# Patient Record
Sex: FEMALE | HISPANIC OR LATINO | Employment: UNEMPLOYED | URBAN - METROPOLITAN AREA
[De-identification: names, ages, dates, MRNs, and addresses within clinical notes are randomized per-mention and may not be internally consistent; named-entity substitution may affect disease eponyms.]

---

## 2017-02-15 ENCOUNTER — OFFICE VISIT (OUTPATIENT)
Dept: CARDIOLOGY | Facility: MEDICAL CENTER | Age: 52
End: 2017-02-15
Payer: COMMERCIAL

## 2017-02-15 VITALS
SYSTOLIC BLOOD PRESSURE: 116 MMHG | WEIGHT: 188 LBS | DIASTOLIC BLOOD PRESSURE: 74 MMHG | HEIGHT: 63 IN | HEART RATE: 74 BPM | OXYGEN SATURATION: 97 % | BODY MASS INDEX: 33.31 KG/M2

## 2017-02-15 DIAGNOSIS — I10 ESSENTIAL HYPERTENSION: ICD-10-CM

## 2017-02-15 DIAGNOSIS — R07.9 CHEST PAIN, UNSPECIFIED TYPE: ICD-10-CM

## 2017-02-15 DIAGNOSIS — R73.03 PRE-DIABETES: ICD-10-CM

## 2017-02-15 PROCEDURE — 99204 OFFICE O/P NEW MOD 45 MIN: CPT | Performed by: INTERNAL MEDICINE

## 2017-02-15 PROCEDURE — 93000 ELECTROCARDIOGRAM COMPLETE: CPT | Performed by: INTERNAL MEDICINE

## 2017-02-15 RX ORDER — LOSARTAN POTASSIUM 50 MG/1
50 TABLET ORAL DAILY
COMMUNITY

## 2017-02-15 ASSESSMENT — ENCOUNTER SYMPTOMS
ABDOMINAL PAIN: 0
WEIGHT LOSS: 0
EYE PAIN: 0
BRUISES/BLEEDS EASILY: 0
PALPITATIONS: 0
MYALGIAS: 0
DEPRESSION: 0
VOMITING: 0
SHORTNESS OF BREATH: 0
DOUBLE VISION: 0
CHILLS: 0
CLAUDICATION: 0
HALLUCINATIONS: 0
LOSS OF CONSCIOUSNESS: 0
COUGH: 0
FEVER: 0
DIZZINESS: 0
BLOOD IN STOOL: 0
NAUSEA: 0
SPEECH CHANGE: 0
SENSORY CHANGE: 0
FALLS: 0
HEADACHES: 0
ORTHOPNEA: 0
BLURRED VISION: 0
PND: 0
EYE DISCHARGE: 0

## 2017-02-15 NOTE — PROGRESS NOTES
Subjective:   Lorena Bermudez is a 51 y.o. female who presents today for evaluation of chest pain. Patient has been reporting of having chest pain for several months. Usually happens at rest at nighttime and persist throughout the night. Chest pain is described as pressure-like sensation. Nonspecific precipitating factors. Nothing makes it better.    Lorena Bermudez does not have any history of heart attack arrhythmias in the past. she never had transthoracic echocardiogram, cardiac catheterization or ablations procedure in the past. At this time, she denies having shortness of breath presyncopal syncopal episodes.     I have reviewed patient's ECG, which shows normal sinus rhythm, normal OR, QT intervals. No evidence of acute coronary syndrome.    Past Medical History   Diagnosis Date   • Indigestion    • Other specified disorder of intestines    • Heart burn    • Psychiatric problem      DEPRESSION     Past Surgical History   Procedure Laterality Date   • Gyn surgery  2008     hysterectomy   • Batsheva by laparoscopy  7/24/2014     Performed by Charles Diop M.D. at SURGERY [facility]     History reviewed. No pertinent family history.  History   Smoking status   • Never Smoker    Smokeless tobacco   • Never Used     No Known Allergies  Outpatient Encounter Prescriptions as of 2/15/2017   Medication Sig Dispense Refill   • losartan (COZAAR) 50 MG Tab Take 50 mg by mouth every day.     • metformin (GLUCOPHAGE) 500 MG Tab Take 500 mg by mouth 2 times a day, with meals.     • HYDROCHLOROTHIAZIDE PO Take  by mouth.     • propranolol (INDERAL) 20 MG TABS Take 20 mg by mouth every day.     • fluoxetine (PROZAC) 20 MG CAPS Take 20 mg by mouth every day.     • clonazepam (KLONOPIN) 0.5 MG TABS Take 0.25 mg by mouth 2 times a day.     • metoclopramide (REGLAN) 10 MG TABS Take 1 Tab by mouth 3 times a day before meals. (Patient not taking: Reported on 2/15/2017) 60 Tab 1   • hydrocodone-acetaminophen  (NORCO) 7.5-325 MG per tablet Take 1-2 Tabs by mouth every 6 hours as needed for Mild Pain. (Patient not taking: Reported on 2/15/2017) 40 Each 0   • enalapril (VASOTEC) 20 MG tablet Take 20 mg by mouth every day.     • clonazepam (KLONOPIN) 0.5 MG TABS Take 0.25 mg by mouth 2 times a day.     • fluoxetine (PROZAC) 20 MG CAPS Take 20 mg by mouth 2 times a day.     • acetaminophen (TYLENOL) 325 MG TABS Take 650 mg by mouth every four hours as needed.     • sucralfate (CARAFATE) 1 GM TABS Take 1 Tab by mouth 4 Times a Day,Before Meals and at Bedtime. (Patient not taking: Reported on 2/15/2017) 120 Tab 3   • enalapril (VASOTEC) 20 MG tablet Take 20 mg by mouth every day.     • propranolol (INDERAL) 20 MG TABS Take 20 mg by mouth 3 times a day.     • quetiapine (SEROQUEL) 25 MG TABS Take 25 mg by mouth 2 times a day.     • simvastatin (ZOCOR) 20 MG TABS Take 20 mg by mouth every evening.     • meclizine (ANTIVERT) 25 MG TABS Take 1 Tab by mouth 3 times a day as needed. (Patient not taking: Reported on 2/15/2017) 30 Tab 0   • fluticasone (FLONASE) 50 MCG/ACT nasal spray Spray 2 Sprays in nose every day. Each Nostril (Patient not taking: Reported on 2/15/2017) 16 g 0   • amlodipine (NORVASC) 5 MG TABS Take 1 Tab by mouth every day. (Patient not taking: Reported on 2/15/2017) 30 Tab 0     No facility-administered encounter medications on file as of 2/15/2017.     Review of Systems   Constitutional: Negative for fever, chills, weight loss and malaise/fatigue.   HENT: Negative for ear discharge, ear pain, hearing loss and nosebleeds.    Eyes: Negative for blurred vision, double vision, pain and discharge.   Respiratory: Negative for cough and shortness of breath.    Cardiovascular: Positive for chest pain. Negative for palpitations, orthopnea, claudication, leg swelling and PND.   Gastrointestinal: Negative for nausea, vomiting, abdominal pain, blood in stool and melena.   Genitourinary: Negative for dysuria and hematuria.  "  Musculoskeletal: Negative for myalgias, joint pain and falls.   Skin: Negative for itching and rash.   Neurological: Negative for dizziness, sensory change, speech change, loss of consciousness and headaches.   Endo/Heme/Allergies: Negative for environmental allergies. Does not bruise/bleed easily.   Psychiatric/Behavioral: Negative for depression, suicidal ideas and hallucinations.        Objective:   /74 mmHg  Pulse 74  Ht 1.6 m (5' 3\")  Wt 85.276 kg (188 lb)  BMI 33.31 kg/m2  SpO2 97%    Physical Exam   Constitutional: She is oriented to person, place, and time. She appears well-developed and well-nourished.   HENT:   Head: Normocephalic and atraumatic.   Eyes: EOM are normal.   Neck: No JVD present.   Cardiovascular: Normal rate, regular rhythm, normal heart sounds and intact distal pulses.  Exam reveals no gallop and no friction rub.    No murmur heard.  Pulmonary/Chest: No respiratory distress. She has no wheezes. She has no rales. She exhibits no tenderness.   Abdominal: She exhibits no distension. There is no tenderness. There is no rebound and no guarding.   Musculoskeletal: She exhibits no edema or tenderness.   Lymphadenopathy:     She has no cervical adenopathy.   Neurological: She is alert and oriented to person, place, and time.   Skin: Skin is dry.   Psychiatric: She has a normal mood and affect.   Nursing note and vitals reviewed.      Assessment:     1. Chest pain, unspecified type  EKG    ECHOCARDIOGRAM COMP W/O CONT    PET SCAN MYOCARDIAL PERFUSION   2. Essential hypertension     3. Pre-diabetes         Medical Decision Making:  Today's Assessment / Status / Plan:     To further stratify:   I will order transthoracic echocardiogram to assess for structural abnormalities.  Based on body habitus, we will submit for myocardial PET scan to assess for coronary ischemia.    Blood pressure is well controlled.  Cont current medications at current dose.     I will see patient back in clinic " with lab tests and studies results in 3 months.    I thank you Reyna for referring patient to our Cardiology Clinic today.

## 2017-02-15 NOTE — Clinical Note
[facility]   [address]  [place] [address]  Phone: [phone]  Fax: [phone]              Lorena Bermudez  1965    Encounter Date: 2/15/2017    Suresh Siegel M.D.          PROGRESS NOTE:  Subjective:   Lorena Bermudez is a 51 y.o. female who presents today for evaluation of chest pain. Patient has been reporting of having chest pain for several months. Usually happens at rest at nighttime and persist throughout the night. Chest pain is described as pressure-like sensation. Nonspecific precipitating factors. Nothing makes it better.    Lorena Bermudez does not have any history of heart attack arrhythmias in the past. she never had transthoracic echocardiogram, cardiac catheterization or ablations procedure in the past. At this time, she denies having shortness of breath presyncopal syncopal episodes.     I have reviewed patient's ECG, which shows normal sinus rhythm, normal CO, QT intervals. No evidence of acute coronary syndrome.    Past Medical History   Diagnosis Date   • Indigestion    • Other specified disorder of intestines    • Heart burn    • Psychiatric problem      DEPRESSION     Past Surgical History   Procedure Laterality Date   • Gyn surgery  2008     hysterectomy   • Batsheva by laparoscopy  7/24/2014     Performed by Charles Diop M.D. at SURGERY [facility]S     History reviewed. No pertinent family history.  History   Smoking status   • Never Smoker    Smokeless tobacco   • Never Used     No Known Allergies  Outpatient Encounter Prescriptions as of 2/15/2017   Medication Sig Dispense Refill   • losartan (COZAAR) 50 MG Tab Take 50 mg by mouth every day.     • metformin (GLUCOPHAGE) 500 MG Tab Take 500 mg by mouth 2 times a day, with meals.     • HYDROCHLOROTHIAZIDE PO Take  by mouth.     • propranolol (INDERAL) 20 MG TABS Take 20 mg by mouth every day.     • fluoxetine (PROZAC) 20 MG CAPS Take 20 mg by  mouth every day.     • clonazepam (KLONOPIN) 0.5 MG TABS Take 0.25 mg by mouth 2 times a day.     • metoclopramide (REGLAN) 10 MG TABS Take 1 Tab by mouth 3 times a day before meals. (Patient not taking: Reported on 2/15/2017) 60 Tab 1   • hydrocodone-acetaminophen (NORCO) 7.5-325 MG per tablet Take 1-2 Tabs by mouth every 6 hours as needed for Mild Pain. (Patient not taking: Reported on 2/15/2017) 40 Each 0   • enalapril (VASOTEC) 20 MG tablet Take 20 mg by mouth every day.     • clonazepam (KLONOPIN) 0.5 MG TABS Take 0.25 mg by mouth 2 times a day.     • fluoxetine (PROZAC) 20 MG CAPS Take 20 mg by mouth 2 times a day.     • acetaminophen (TYLENOL) 325 MG TABS Take 650 mg by mouth every four hours as needed.     • sucralfate (CARAFATE) 1 GM TABS Take 1 Tab by mouth 4 Times a Day,Before Meals and at Bedtime. (Patient not taking: Reported on 2/15/2017) 120 Tab 3   • enalapril (VASOTEC) 20 MG tablet Take 20 mg by mouth every day.     • propranolol (INDERAL) 20 MG TABS Take 20 mg by mouth 3 times a day.     • quetiapine (SEROQUEL) 25 MG TABS Take 25 mg by mouth 2 times a day.     • simvastatin (ZOCOR) 20 MG TABS Take 20 mg by mouth every evening.     • meclizine (ANTIVERT) 25 MG TABS Take 1 Tab by mouth 3 times a day as needed. (Patient not taking: Reported on 2/15/2017) 30 Tab 0   • fluticasone (FLONASE) 50 MCG/ACT nasal spray Spray 2 Sprays in nose every day. Each Nostril (Patient not taking: Reported on 2/15/2017) 16 g 0   • amlodipine (NORVASC) 5 MG TABS Take 1 Tab by mouth every day. (Patient not taking: Reported on 2/15/2017) 30 Tab 0     No facility-administered encounter medications on file as of 2/15/2017.     Review of Systems   Constitutional: Negative for fever, chills, weight loss and malaise/fatigue.   HENT: Negative for ear discharge, ear pain, hearing loss and nosebleeds.    Eyes: Negative for blurred vision, double vision, pain and discharge.   Respiratory: Negative for cough and shortness of breath.   "  Cardiovascular: Positive for chest pain. Negative for palpitations, orthopnea, claudication, leg swelling and PND.   Gastrointestinal: Negative for nausea, vomiting, abdominal pain, blood in stool and melena.   Genitourinary: Negative for dysuria and hematuria.   Musculoskeletal: Negative for myalgias, joint pain and falls.   Skin: Negative for itching and rash.   Neurological: Negative for dizziness, sensory change, speech change, loss of consciousness and headaches.   Endo/Heme/Allergies: Negative for environmental allergies. Does not bruise/bleed easily.   Psychiatric/Behavioral: Negative for depression, suicidal ideas and hallucinations.        Objective:   /74 mmHg  Pulse 74  Ht 1.6 m (5' 3\")  Wt 85.276 kg (188 lb)  BMI 33.31 kg/m2  SpO2 97%    Physical Exam   Constitutional: She is oriented to person, place, and time. She appears well-developed and well-nourished.   HENT:   Head: Normocephalic and atraumatic.   Eyes: EOM are normal.   Neck: No JVD present.   Cardiovascular: Normal rate, regular rhythm, normal heart sounds and intact distal pulses.  Exam reveals no gallop and no friction rub.    No murmur heard.  Pulmonary/Chest: No respiratory distress. She has no wheezes. She has no rales. She exhibits no tenderness.   Abdominal: She exhibits no distension. There is no tenderness. There is no rebound and no guarding.   Musculoskeletal: She exhibits no edema or tenderness.   Lymphadenopathy:     She has no cervical adenopathy.   Neurological: She is alert and oriented to person, place, and time.   Skin: Skin is dry.   Psychiatric: She has a normal mood and affect.   Nursing note and vitals reviewed.      Assessment:     1. Chest pain, unspecified type  EKG    ECHOCARDIOGRAM COMP W/O CONT    PET SCAN MYOCARDIAL PERFUSION   2. Essential hypertension     3. Pre-diabetes         Medical Decision Making:  Today's Assessment / Status / Plan:     To further stratify:   I will order transthoracic " echocardiogram to assess for structural abnormalities.  Based on body habitus, we will submit for myocardial PET scan to assess for coronary ischemia.    Blood pressure is well controlled.  Cont current medications at current dose.     I will see patient back in clinic with lab tests and studies results in 3 months.    I thank you Reyna for referring patient to our Cardiology Clinic today.        JULINAO Chiu  [address] [place] [id number]  VIA Facsimile: [phone]

## 2017-02-15 NOTE — MR AVS SNAPSHOT
"        Lorenapj LyleCharlette   2/15/2017 2:15 PM   Office Visit   MRN: [id number]    Department:  [facility]   Dept Phone:  [phone]    Description:  Female : 1965   Provider:  Suresh Siegel M.D.           Reason for Visit     New Patient           Allergies as of 2/15/2017     No Known Allergies      You were diagnosed with     Chest pain, unspecified type   [2025564]       Essential hypertension   [6536526]       Pre-diabetes   [045701]         Vital Signs     Blood Pressure Pulse Height Weight Body Mass Index Oxygen Saturation    116/74 mmHg 74 1.6 m (5' 3\") 85.276 kg (188 lb) 33.31 kg/m2 97%    Smoking Status                   Never Smoker            Basic Information     Date Of Birth Sex Race Ethnicity Preferred Language    1965 Female  or   Origin (Mongolian,Bahamian,[place]ish,[place] citizen, etc) Mongolian      Problem List              ICD-10-CM Priority Class Noted - Resolved    Other specified pre-operative examination Z01.818   2014 - Present    Thrombocytopenia, idiopathic (CMS-HCC) D69.3   2014 - Present      Health Maintenance        Date Due Completion Dates    IMM DTaP/Tdap/Td Vaccine (1 - Tdap) 1982 ---    PAP SMEAR 1984 ---    MAMMOGRAM 2003 ---    COLONOSCOPY 2013 ---    IMM INFLUENZA (1) 2016 ---            Results       Current Immunizations     No immunizations on file.      Below and/or attached are the medications your provider expects you to take. Review all of your home medications and newly ordered medications with your provider and/or pharmacist. Follow medication instructions as directed by your provider and/or pharmacist. Please keep your medication list with you and share with your provider. Update the information when medications are discontinued, doses are changed, or new medications (including over-the-counter products) are added; and carry medication information at all times in the event of emergency situations   "    Allergies:  No Known Allergies          Medications  Valid as of: February 15, 2017 -  2:39 PM    Generic Name Brand Name Tablet Size Instructions for use    Acetaminophen (Tab) TYLENOL 325 MG Take 650 mg by mouth every four hours as needed.        AmLODIPine Besylate (Tab) NORVASC 5 MG Take 1 Tab by mouth every day.        ClonazePAM (Tab) KLONOPIN 0.5 MG Take 0.25 mg by mouth 2 times a day.        ClonazePAM (Tab) KLONOPIN 0.5 MG Take 0.25 mg by mouth 2 times a day.        Enalapril Maleate (Tab) VASOTEC 20 MG Take 20 mg by mouth every day.        Enalapril Maleate (Tab) VASOTEC 20 MG Take 20 mg by mouth every day.        FLUoxetine HCl (Cap) PROZAC 20 MG Take 20 mg by mouth every day.        FLUoxetine HCl (Cap) PROZAC 20 MG Take 20 mg by mouth 2 times a day.        Fluticasone Propionate (Suspension) FLONASE 50 MCG/ACT Spray 2 Sprays in nose every day. Each Nostril        HydroCHLOROthiazide   Take  by mouth.        Hydrocodone-Acetaminophen (Tab) NORCO 7.5-325 MG Take 1-2 Tabs by mouth every 6 hours as needed for Mild Pain.        Losartan Potassium (Tab) COZAAR 50 MG Take 50 mg by mouth every day.        Meclizine HCl (Tab) ANTIVERT 25 MG Take 1 Tab by mouth 3 times a day as needed.        MetFORMIN HCl (Tab) GLUCOPHAGE 500 MG Take 500 mg by mouth 2 times a day, with meals.        Metoclopramide HCl (Tab) REGLAN 10 MG Take 1 Tab by mouth 3 times a day before meals.        Propranolol HCl (Tab) INDERAL 20 MG Take 20 mg by mouth 3 times a day.        Propranolol HCl (Tab) INDERAL 20 MG Take 20 mg by mouth every day.        QUEtiapine Fumarate (Tab) SEROQUEL 25 MG Take 25 mg by mouth 2 times a day.        Simvastatin (Tab) ZOCOR 20 MG Take 20 mg by mouth every evening.        Sucralfate (Tab) CARAFATE 1 GM Take 1 Tab by mouth 4 Times a Day,Before Meals and at Bedtime.        .                 Medicines prescribed today were sent to:     [facility] [address] [facility] ([place] - [address] [facility]  [place] [address]    Phone: [phone] Fax: [phone]    Open 24 Hours?: No    [facility] - [place], [place] - [address]    [address] [place] [address]    Phone: [phone] Fax: [phone]    Open 24 Hours?: No      Medication refill instructions:       If your prescription bottle indicates you have medication refills left, it is not necessary to call your provider’s office. Please contact your pharmacy and they will refill your medication.    If your prescription bottle indicates you do not have any refills left, you may request refills at any time through one of the following ways: The online Open CS system (except Urgent Care), by calling your provider’s office, or by asking your pharmacy to contact your provider’s office with a refill request. Medication refills are processed only during regular business hours and may not be available until the next business day. Your provider may request additional information or to have a follow-up visit with you prior to refilling your medication.   *Please Note: Medication refills are assigned a new Rx number when refilled electronically. Your pharmacy may indicate that no refills were authorized even though a new prescription for the same medication is available at the pharmacy. Please request the medicine by name with the pharmacy before contacting your provider for a refill.        Your To Do List     Future Labs/Procedures Complete By Expires    ECHOCARDIOGRAM COMP W/O CONT  As directed 2/16/2018         Open CS Access Code: [email or web address]  Expires: 3/17/2017  2:39 PM    Open CS  A secure, online tool to manage your health information     [employer]’s Open CS® is a secure, online tool that connects you to your personalized health information from the privacy of your home -- day or night - making it very easy for you to manage your healthcare. Once the activation process is completed, you can even access your medical information  using the [employer] odalis, which is available for free in the Apple Odalis store or Google Play store.     [employer] provides the following levels of access (as shown below):   My Chart Features   [facility] Doctor [facility]  Specialists [facility]  Urgent  Care Non-[facility]  Primary Care  Doctor   Email your healthcare team securely and privately 24/7 X X X    Manage appointments: schedule your next appointment; view details of past/upcoming appointments X      Request prescription refills. X      View recent personal medical records, including lab and immunizations X X X X   View health record, including health history, allergies, medications X X X X   Read reports about your outpatient visits, procedures, consult and ER notes X X X X   See your discharge summary, which is a recap of your hospital and/or ER visit that includes your diagnosis, lab results, and care plan. X X       How to register for [employer]:  1. Go to  [email or web address].  2. Click on the Sign Up Now box, which takes you to the New Member Sign Up page. You will need to provide the following information:  a. Enter your [employer] Access Code exactly as it appears at the top of this page. (You will not need to use this code after you’ve completed the sign-up process. If you do not sign up before the expiration date, you must request a new code.)   b. Enter your date of birth.   c. Enter your home email address.   d. Click Submit, and follow the next screen’s instructions.  3. Create a [employer] ID. This will be your [employer] login ID and cannot be changed, so think of one that is secure and easy to remember.  4. Create a [employer] password. You can change your password at any time.  5. Enter your Password Reset Question and Answer. This can be used at a later time if you forget your password.   6. Enter your e-mail address. This allows you to receive e-mail notifications when new information is available in [employer].  7. Click Sign Up. You can now view your  health information.    For assistance activating your [employer] account, call [phone]

## 2017-02-16 LAB — EKG IMPRESSION: NORMAL

## 2017-03-10 ENCOUNTER — HOSPITAL ENCOUNTER (OUTPATIENT)
Dept: LAB | Facility: MEDICAL CENTER | Age: 52
End: 2017-03-10
Attending: NURSE PRACTITIONER
Payer: COMMERCIAL

## 2017-03-10 ENCOUNTER — HOSPITAL ENCOUNTER (OUTPATIENT)
Dept: LAB | Facility: MEDICAL CENTER | Age: 52
End: 2017-03-10
Attending: INTERNAL MEDICINE
Payer: COMMERCIAL

## 2017-03-10 LAB
25(OH)D3 SERPL-MCNC: 15 NG/ML (ref 30–100)
ALBUMIN SERPL BCP-MCNC: 4.5 G/DL (ref 3.2–4.9)
ALBUMIN/GLOB SERPL: 1.3 G/DL
ALP SERPL-CCNC: 59 U/L (ref 30–99)
ALT SERPL-CCNC: 67 U/L (ref 2–50)
ANION GAP SERPL CALC-SCNC: 9 MMOL/L (ref 0–11.9)
AST SERPL-CCNC: 49 U/L (ref 12–45)
BASOPHILS # BLD AUTO: 0.04 K/UL (ref 0–0.12)
BASOPHILS NFR BLD AUTO: 0.5 % (ref 0–1.8)
BILIRUB SERPL-MCNC: 1.2 MG/DL (ref 0.1–1.5)
BUN SERPL-MCNC: 16 MG/DL (ref 8–22)
CALCIUM SERPL-MCNC: 10 MG/DL (ref 8.5–10.5)
CHLORIDE SERPL-SCNC: 102 MMOL/L (ref 96–112)
CHOLEST SERPL-MCNC: 239 MG/DL (ref 100–199)
CO2 SERPL-SCNC: 26 MMOL/L (ref 20–33)
CREAT SERPL-MCNC: 0.65 MG/DL (ref 0.5–1.4)
EOSINOPHIL # BLD: 0.1 K/UL (ref 0–0.51)
EOSINOPHIL NFR BLD AUTO: 1.4 % (ref 0–6.9)
ERYTHROCYTE [DISTWIDTH] IN BLOOD BY AUTOMATED COUNT: 44 FL (ref 35.9–50)
ERYTHROCYTE [SEDIMENTATION RATE] IN BLOOD BY WESTERGREN METHOD: 17 MM/HOUR (ref 0–30)
EST. AVERAGE GLUCOSE BLD GHB EST-MCNC: 117 MG/DL
FOLATE SERPL-MCNC: 11.9 NG/ML
GLOBULIN SER CALC-MCNC: 3.6 G/DL (ref 1.9–3.5)
GLUCOSE SERPL-MCNC: 84 MG/DL (ref 65–99)
HBA1C MFR BLD: 5.7 % (ref 0–5.6)
HCT VFR BLD AUTO: 45.4 % (ref 37–47)
HDLC SERPL-MCNC: 45 MG/DL
HGB BLD-MCNC: 15.4 G/DL (ref 12–16)
IMM GRANULOCYTES # BLD AUTO: 0.03 K/UL (ref 0–0.11)
IMM GRANULOCYTES NFR BLD AUTO: 0.4 % (ref 0–0.9)
LDLC SERPL CALC-MCNC: 125 MG/DL
LYMPHOCYTES # BLD: 2.4 K/UL (ref 1–4.8)
LYMPHOCYTES NFR BLD AUTO: 32.4 % (ref 22–41)
MCH RBC QN AUTO: 32.6 PG (ref 27–33)
MCHC RBC AUTO-ENTMCNC: 33.9 G/DL (ref 33.6–35)
MCV RBC AUTO: 96.2 FL (ref 81.4–97.8)
MONOCYTES # BLD: 0.49 K/UL (ref 0–0.85)
MONOCYTES NFR BLD AUTO: 6.6 % (ref 0–13.4)
NEUTROPHILS # BLD: 4.34 K/UL (ref 2–7.15)
NEUTROPHILS NFR BLD AUTO: 58.7 % (ref 44–72)
NRBC # BLD AUTO: 0 K/UL
NRBC BLD-RTO: 0 /100 WBC
PLATELET # BLD AUTO: 124 K/UL (ref 164–446)
PMV BLD AUTO: 14.3 FL (ref 9–12.9)
POTASSIUM SERPL-SCNC: 3.5 MMOL/L (ref 3.6–5.5)
PROT SERPL-MCNC: 8.1 G/DL (ref 6–8.2)
RBC # BLD AUTO: 4.72 M/UL (ref 4.2–5.4)
SODIUM SERPL-SCNC: 137 MMOL/L (ref 135–145)
T3FREE SERPL-MCNC: 3.44 PG/ML (ref 2.4–4.2)
T4 FREE SERPL-MCNC: 0.78 NG/DL (ref 0.53–1.43)
T4 FREE SERPL-MCNC: 0.79 NG/DL (ref 0.53–1.43)
TRIGL SERPL-MCNC: 345 MG/DL (ref 0–149)
TSH SERPL DL<=0.005 MIU/L-ACNC: 1.7 UIU/ML (ref 0.3–3.7)
TSH SERPL DL<=0.005 MIU/L-ACNC: 1.91 UIU/ML (ref 0.3–3.7)
VIT B12 SERPL-MCNC: 383 PG/ML (ref 211–911)
WBC # BLD AUTO: 7.4 K/UL (ref 4.8–10.8)

## 2017-03-10 PROCEDURE — 82607 VITAMIN B-12: CPT

## 2017-03-10 PROCEDURE — 85025 COMPLETE CBC W/AUTO DIFF WBC: CPT

## 2017-03-10 PROCEDURE — 84443 ASSAY THYROID STIM HORMONE: CPT

## 2017-03-10 PROCEDURE — 85652 RBC SED RATE AUTOMATED: CPT

## 2017-03-10 PROCEDURE — 80061 LIPID PANEL: CPT

## 2017-03-10 PROCEDURE — 82306 VITAMIN D 25 HYDROXY: CPT

## 2017-03-10 PROCEDURE — 84481 FREE ASSAY (FT-3): CPT

## 2017-03-10 PROCEDURE — 82746 ASSAY OF FOLIC ACID SERUM: CPT

## 2017-03-10 PROCEDURE — 84439 ASSAY OF FREE THYROXINE: CPT

## 2017-03-10 PROCEDURE — 36415 COLL VENOUS BLD VENIPUNCTURE: CPT

## 2017-03-10 PROCEDURE — 80053 COMPREHEN METABOLIC PANEL: CPT

## 2017-03-10 PROCEDURE — 83036 HEMOGLOBIN GLYCOSYLATED A1C: CPT

## 2017-03-10 PROCEDURE — 84439 ASSAY OF FREE THYROXINE: CPT | Mod: 91

## 2017-03-10 PROCEDURE — 84443 ASSAY THYROID STIM HORMONE: CPT | Mod: 91

## 2017-03-15 ENCOUNTER — HOSPITAL ENCOUNTER (OUTPATIENT)
Dept: RADIOLOGY | Facility: MEDICAL CENTER | Age: 52
End: 2017-03-15
Attending: INTERNAL MEDICINE
Payer: COMMERCIAL

## 2017-03-15 DIAGNOSIS — R07.9 CHEST PAIN, UNSPECIFIED TYPE: ICD-10-CM

## 2017-03-15 PROCEDURE — A9555 RB82 RUBIDIUM: HCPCS

## 2017-03-15 PROCEDURE — 700111 HCHG RX REV CODE 636 W/ 250 OVERRIDE (IP)

## 2017-03-15 PROCEDURE — 93017 CV STRESS TEST TRACING ONLY: CPT

## 2017-03-16 NOTE — PROCEDURES
REFERRING PHYSICIAN:  Dr. Andrew Siegel.    PRIMARY CARE PROVIDER:  DORINA Chiu.    AGE:  51.  GENDER:  Female.  HEIGHT:  63 inches.  WEIGHT:  188 pounds.    INDICATION:  Chest pain.    PROCEDURE:  The patient reviewed and signed the acknowledgement for testing   form.  The patient was in a fasting state and was properly prepared for   testing.  An intravenous line was inserted and a flush of normal saline   followed to insure line patency.    A transmission scan was acquired for attenuation correction using the internal   Germanium sources.  The patient was then administered 30.0 mCi of   Rubidium-82.  Approximately 90 seconds after the infusion, resting imagine   were obtained with ECG-gating.  Following the resting series, the patient   administered 45 mg of dipyridamole over four minutes.  The blood pressure,   heart rate and ECG were monitored and recorded.  After the dipyridamole   infusion was completed, another transmission scan for attenuation correction   was obtained.  The patient was then administered 30.2 mCi of Rubidium-82.    Approximately 90 seconds after the infusion, Peak stress images were obtained   with ECG-gating.    CLINICAL RESPONSE:  Resting blood pressure was 132/84 with a heart rate of 73.    Immediately post-dipyridamole infusion the blood pressure was 122/71 with a   heart rate of 88.  After a recovery period the blood pressure was 131/76 with   a heart rate of 96.    The patient experienced headache during testing.    Aminophylline 100 mg was administered following the scan.    ELECTROCARDIOGRAPHIC FINDINGS:  At rest, the patient has sinus rhythm.  With   dipyridamole infusion, there were no ischemic changes noted.    SCINTOGRAPHIC FINDINGS:  In both stress and rest, PET imaging, there is normal   myocardial perfusion.  No evidence of ischemia or infarction.    GATED WALL MOTION FINDINGS:  By gated PET, there is normal regional wall   motion.  The measured ejection fraction at  rest is 58%.    OVERALL IMPRESSION:  Negative PET perfusion test for ischemia.  No evidence of   infarction or ischemia.  Normal wall motion and resting ejection fraction.       ____________________________________     MD TRISTAN Galo / MURPHY    DD:  03/15/2017 17:36:10  DT:  03/15/2017 18:56:57    D#:  [id number]  Job#:  [id number]    cc: Andrew Syed MD

## 2017-03-31 ENCOUNTER — HOSPITAL ENCOUNTER (OUTPATIENT)
Dept: LAB | Facility: MEDICAL CENTER | Age: 52
End: 2017-03-31
Attending: INTERNAL MEDICINE
Payer: COMMERCIAL

## 2017-03-31 LAB
ALBUMIN SERPL BCP-MCNC: 4.6 G/DL (ref 3.2–4.9)
ALBUMIN/GLOB SERPL: 1.2 G/DL
ALP SERPL-CCNC: 67 U/L (ref 30–99)
ALT SERPL-CCNC: 44 U/L (ref 2–50)
ANION GAP SERPL CALC-SCNC: 10 MMOL/L (ref 0–11.9)
AST SERPL-CCNC: 31 U/L (ref 12–45)
BILIRUB SERPL-MCNC: 1.2 MG/DL (ref 0.1–1.5)
BUN SERPL-MCNC: 10 MG/DL (ref 8–22)
CALCIUM SERPL-MCNC: 9.6 MG/DL (ref 8.5–10.5)
CHLORIDE SERPL-SCNC: 97 MMOL/L (ref 96–112)
CO2 SERPL-SCNC: 32 MMOL/L (ref 20–33)
CREAT SERPL-MCNC: 0.71 MG/DL (ref 0.5–1.4)
GLOBULIN SER CALC-MCNC: 4 G/DL (ref 1.9–3.5)
GLUCOSE SERPL-MCNC: 117 MG/DL (ref 65–99)
POTASSIUM SERPL-SCNC: 2.9 MMOL/L (ref 3.6–5.5)
PROT SERPL-MCNC: 8.6 G/DL (ref 6–8.2)
SODIUM SERPL-SCNC: 139 MMOL/L (ref 135–145)

## 2017-03-31 PROCEDURE — 36415 COLL VENOUS BLD VENIPUNCTURE: CPT

## 2017-03-31 PROCEDURE — 80053 COMPREHEN METABOLIC PANEL: CPT

## 2017-12-05 ENCOUNTER — APPOINTMENT (OUTPATIENT)
Dept: RADIOLOGY | Facility: IMAGING CENTER | Age: 52
End: 2017-12-05
Attending: PHYSICIAN ASSISTANT
Payer: COMMERCIAL

## 2017-12-05 ENCOUNTER — OFFICE VISIT (OUTPATIENT)
Dept: URGENT CARE | Facility: CLINIC | Age: 52
End: 2017-12-05
Payer: COMMERCIAL

## 2017-12-05 VITALS
OXYGEN SATURATION: 91 % | HEART RATE: 73 BPM | SYSTOLIC BLOOD PRESSURE: 124 MMHG | DIASTOLIC BLOOD PRESSURE: 74 MMHG | TEMPERATURE: 98.2 F | RESPIRATION RATE: 14 BRPM

## 2017-12-05 DIAGNOSIS — R07.89 CHEST TIGHTNESS OR PRESSURE: ICD-10-CM

## 2017-12-05 DIAGNOSIS — R07.89 CHEST TIGHTNESS OR PRESSURE: Primary | ICD-10-CM

## 2017-12-05 DIAGNOSIS — R30.0 DYSURIA: ICD-10-CM

## 2017-12-05 LAB
APPEARANCE UR: CLEAR
BILIRUB UR STRIP-MCNC: NORMAL MG/DL
COLOR UR AUTO: YELLOW
GLUCOSE UR STRIP.AUTO-MCNC: NORMAL MG/DL
KETONES UR STRIP.AUTO-MCNC: NORMAL MG/DL
LEUKOCYTE ESTERASE UR QL STRIP.AUTO: NORMAL
NITRITE UR QL STRIP.AUTO: NORMAL
PH UR STRIP.AUTO: 5 [PH] (ref 5–8)
PROT UR QL STRIP: NORMAL MG/DL
RBC UR QL AUTO: NORMAL
SP GR UR STRIP.AUTO: 1
UROBILINOGEN UR STRIP-MCNC: 0.2 MG/DL

## 2017-12-05 PROCEDURE — 71020 DX-CHEST-2 VIEWS: CPT | Mod: TC | Performed by: PHYSICIAN ASSISTANT

## 2017-12-05 PROCEDURE — 81002 URINALYSIS NONAUTO W/O SCOPE: CPT | Performed by: PHYSICIAN ASSISTANT

## 2017-12-05 PROCEDURE — 99213 OFFICE O/P EST LOW 20 MIN: CPT | Performed by: PHYSICIAN ASSISTANT

## 2017-12-06 NOTE — PROGRESS NOTES
Subjective:      Pt is a 52 y.o. female who presents with Chest Pain (x 1 day, states she took ibuprofen and it caused her to have a UTI)            HPI  Pt notes she stopped a depression medication last night which she feels triggered mild chest pressure which she took ibuprofen for last night and it resolved. She has had issues with depression and anxiety in the past triggering chest pressure at night for her and she has seen [facility] in Feb 2017 for this exact issue and cleared for cardiopulmonary disease. She notes new onset mild dysuria since stopping the medication x 1 day as well and is concerned for UTI. Pt has not taken any Rx medications for this condition. Pt denies suicidal ideation, intention or plan and feels safe at home and work. Pt states the pain is a 2/10 with mild dysuria, aching in nature and worse at night. Pt denies  SOB, NVD, paresthesias, headaches, dizziness, change in vision, hives, or other joint pain. The pt's medication list, problem list, and allergies have been evaluated and reviewed during today's visit.        PMH:  Past Medical History:   Diagnosis Date   • Heart burn    • Indigestion    • Other specified disorder of intestines    • Psychiatric problem     DEPRESSION       PSH:  Past Surgical History:   Procedure Laterality Date   • LUDMILA BY LAPAROSCOPY  7/24/2014    Performed by Charles Diop M.D. at SURGERY [facility] ORS   • GYN SURGERY  2008    hysterectomy       Fam Hx:  the patient's family history is not pertinent to their current complaint    Soc HX:  Social History     Social History   • Marital status:      Spouse name: N/A   • Number of children: N/A   • Years of education: N/A     Occupational History   • Not on file.     Social History Main Topics   • Smoking status: Never Smoker   • Smokeless tobacco: Never Used   • Alcohol use No   • Drug use: No   • Sexual activity: Not on file     Other Topics Concern   • Not on file     Social History Narrative    **  Merged History Encounter **              Medications:    Current Outpatient Prescriptions:   •  AZO-CRANBERRY PO, Take  by mouth., Disp: , Rfl:   •  losartan (COZAAR) 50 MG Tab, Take 50 mg by mouth every day., Disp: , Rfl:   •  metformin (GLUCOPHAGE) 500 MG Tab, Take 500 mg by mouth 2 times a day, with meals., Disp: , Rfl:   •  HYDROCHLOROTHIAZIDE PO, Take  by mouth., Disp: , Rfl:   •  hydrocodone-acetaminophen (NORCO) 7.5-325 MG per tablet, Take 1-2 Tabs by mouth every 6 hours as needed for Mild Pain. (Patient not taking: Reported on 2/15/2017), Disp: 40 Each, Rfl: 0  •  enalapril (VASOTEC) 20 MG tablet, Take 20 mg by mouth every day., Disp: , Rfl:   •  propranolol (INDERAL) 20 MG TABS, Take 20 mg by mouth every day., Disp: , Rfl:   •  clonazepam (KLONOPIN) 0.5 MG TABS, Take 0.25 mg by mouth 2 times a day., Disp: , Rfl:   •  fluoxetine (PROZAC) 20 MG CAPS, Take 20 mg by mouth 2 times a day., Disp: , Rfl:   •  acetaminophen (TYLENOL) 325 MG TABS, Take 650 mg by mouth every four hours as needed., Disp: , Rfl:   •  enalapril (VASOTEC) 20 MG tablet, Take 20 mg by mouth every day., Disp: , Rfl:   •  propranolol (INDERAL) 20 MG TABS, Take 20 mg by mouth 3 times a day., Disp: , Rfl:   •  quetiapine (SEROQUEL) 25 MG TABS, Take 25 mg by mouth 2 times a day., Disp: , Rfl:   •  fluoxetine (PROZAC) 20 MG CAPS, Take 20 mg by mouth every day., Disp: , Rfl:   •  simvastatin (ZOCOR) 20 MG TABS, Take 20 mg by mouth every evening., Disp: , Rfl:   •  clonazepam (KLONOPIN) 0.5 MG TABS, Take 0.25 mg by mouth 2 times a day., Disp: , Rfl:       Allergies:  Patient has no known allergies.    ROS  Constitutional: Negative for fever, chills and malaise/fatigue.   HENT: Negative for congestion and sore throat.    Eyes: Negative for blurred vision, double vision and photophobia.   Respiratory: Negative for cough and shortness of breath.    Cardiovascular: POS for chest tightness.   Gastrointestinal: Negative for heartburn, nausea, vomiting,  abdominal pain, diarrhea and constipation.   Genitourinary: POS for dysuria and NEG flank pain.   Musculoskeletal: Negative for joint pain and myalgias.   Skin: Negative for itching and rash.   Neurological: Negative for dizziness, tingling and headaches.   Endo/Heme/Allergies: Does not bruise/bleed easily.   Psychiatric/Behavioral: Negative for depression. The patient is not nervous/anxious.           Objective:     /74   Pulse 73   Temp 36.8 °C (98.2 °F)   Resp 14   SpO2 91%      Physical Exam      Constitutional: PT is oriented to person, place, and time. PT appears well-developed and well-nourished. No distress.   HENT:   Head: Normocephalic and atraumatic.   Mouth/Throat: Oropharynx is clear and moist. No oropharyngeal exudate.   Eyes: Conjunctivae normal and EOM are normal. Pupils are equal, round, and reactive to light.   Neck: Normal range of motion. Neck supple. No thyromegaly present.   Cardiovascular: Normal rate, regular rhythm, normal heart sounds and intact distal pulses.  Exam reveals no gallop and no friction rub.    No murmur heard.  Pulmonary/Chest: Effort normal and breath sounds normal. No respiratory distress. PT has no wheezes. PT has no rales. Pt exhibits no tenderness.   Abdominal: Soft. Bowel sounds are normal. PT exhibits no distension and no mass. There is no tenderness. There is no rebound and no guarding.   Musculoskeletal: Normal range of motion. PT exhibits no edema and no tenderness.   Neurological: PT is alert and oriented to person, place, and time. PT has normal reflexes. No cranial nerve deficit.   Skin: Skin is warm and dry. No rash noted. PT is not diaphoretic. No erythema.       Psychiatric: PT is emotionally labile and tearful when discussing why she stopped her depression medication    RADS:  Narrative       12/5/2017 5:12 PM    HISTORY/REASON FOR EXAM:  Chest pain for 2 days.      TECHNIQUE/EXAM DESCRIPTION AND NUMBER OF VIEWS:  Two views of the  "chest.    COMPARISON:  None.    FINDINGS:  The heart is normal in size.  No pulmonary infiltrates or consolidations are noted.  No pleural effusions are appreciated.  No pneumothorax is identified.   Impression       Negative two views of the chest.   Reading Provider Reading Date   Huey Hogan M.D. Dec 5, 2017   Signing Provider Signing Date Signing Time   Huey Hogan M.D. Dec 5, 2017  5:23 PM       Assessment/Plan:         1. Chest pressure  2. Dysuria    Pt has seen [facility] for pressure like chest pain in past apparently linked to depression, stress and anxiety. Pt notes she recently stopped a depression medication as she felt she was taking \"too many medications\" and is tearful about this and will not see her psychiatrist until Feb 2018.   STRICT ER precautions discussed  Pt does not have any history of heart attack arrhythmias in the past. she never had transthoracic echocardiogram, cardiac catheterization or ablations procedure in the past. At this time, she denies having shortness of breath presyncopal syncopal episodes.      I have reviewed patient's ECG, which shows normal sinus rhythm, normal NE, QT intervals. No evidence of acute coronary syndrome.  POC urinalysis-->WNL    ECG-->WNL NSR  CXR-->WNL  POC urinalysis-->WNL  Pt encouraged to restart her Prozac which she abruptly stopped last night and counseling given not to stop these medications as she had just seen her psychiatrist last week and encouraged to continue medications.   Pt to follow up with PCP and psychiatrist  Pt denies suicidal ideation, intention or plan and feels safe at home and work.   Rest, fluids encouraged.  AVS with medical info given.  Pt was in full understanding and agreement with the plan.  Follow-up as needed if symptoms worsen or fail to improve.    "

## 2017-12-06 NOTE — PATIENT INSTRUCTIONS
Fluoxetine capsules or tablets (Depression/Mood Disorders)  ¿Qué es keke medicamento?  La FLUOXETINA pertenece a un fabiano de medicamentos llamados inhibidores selectivos de la recaptación de serotonina (ISRS). Ayuda a tratar problemas de estado de ánimo, tales jackie depresión, trastorno obsesivo-compulsivo y trastorno de pánico. También puede tratar ciertos trastornos de la alimentación.  Keke medicamento puede ser utilizado para otros usos; si tiene alguna pregunta consulte con farley proveedor de atención médica o con farley farmacéutico.  MARCAS COMERCIALES DISPONIBLES: Prozac  ¿Qué le shannon informar a mi profesional de la kamran antes de rai keke medicamento?  Necesita saber si usted presenta alguno de los siguientes problemas o situaciones:  -trastorno bipolar o manía  -diabetes  -glaucoma  -enfermedad hepática  -psicosis  -convulsiones  -ideas suicidas o antecedentes de intento de suicidio  -bobby reacción alérgica o inusual a la fluoxetina, otros medicamentos, alimentos, colorantes o conservantes  -si está embarazada o buscando quedar embarazada  -si está amamantando a un bebé  ¿Cómo shannon utilizar keke medicamento?  Bock keke medicamento por vía oral con un vaso de agua. Siga las instrucciones de la etiqueta del medicamento. Puede rai keke medicamento con o sin alimentos. Bock merna dosis a intervalos regulares. No tome farley medicamento con bobby frecuencia mayor a la indicada. No deje de rai keke medicamento repentinamente a menos que así indique farley médico. El detener keke medicamento demasiado rápido puede causar efectos secundarios graves o puede empeorar farley condición.   Farley farmacéutico le dará bobby Guía del medicamento especial con cada receta y relleno. Asegúrese de leer esta información cada vez cuidadosamente.  Hable con farley pediatra para informarse acerca del uso de keke medicamento en niños. Aunque keke medicamento puede ser recetado a niños dougherty menores jackie de 7 años de edad para condiciones selectivas, las  precauciones se aplican.  Sobredosis: Póngase en contacto inmediatamente con un centro toxicológico o bobby sheri de urgencia si usted estefani que haya tomado demasiado medicamento.  ATENCIÓN: Valentin medicamento es solo para usted. No comparta valentin medicamento con nadie.  ¿Qué sucede si me olvido de bobby dosis?  Si olvida bobby dosis, sáltese la dosis olvidada y vuelva al horario habitual de merna dosis. No tome dosis adicionales o dobles.  ¿Qué puede interactuar con valentin medicamento?  No tome valentin medicamento con ninguno de los siguientes medicamentos:  -otros medicamentos que contienen fluoxetina, tales jackie Sarafem o Symbyax  -cisapride  -linezolid  -IMAOs, tales jackie Carbex, Eldepryl, Marplan, Nardil y Parnate  -fiona de metileno (vía intravenosa)  -pimozida  -tioridazina  Valentin medicamentotambién puede interactuar con los siguientes medicamentos:  -alcohol  -aspirina o medicamentos tipo aspirina  -carbamazepina  -ciertos medicamentos para la depresión, ansiedad o trastornos psicóticos  -ciertos medicamentos para migrañas, tales jackie almotriptán, eletriptán, frovatriptán, naratriptán, rizatriptán, sumatriptán, zolmitriptán  -digoxina  -diuréticos  -fentanilo  -flecainida  -furazolidona  -isoniazida  -litio  -medicamentos para conciliar el sueño  -medicamentos que tratan o previenen coágulos sanguíneos, tales jackie warfarina, enoxaparina y dalteparina  -TERESSA, medicamentos para el dolor o la inflamación, tales jackie el ibuprofeno o naproxeno  -fenitoína  -procarbazina  -propafenona  -rasagilina  -ritonavir  -suplementos jackie hiersergey de bravo Pollockva kava, valeriana  -tramadol  -triptófano  -vinblastina  Puede ser que esta lista no menciona todas las posibles interacciones. Informe a farley profesional de la kamran de todos los productos a base de hierbas, medicamentos de venta harry o suplementos nutritivos que esté tomando. Si usted fuma, consume bebidas alcohólicas o si utiliza drogas ilegales, indíqueselo también a farley profesional de  la kamran. Algunas sustancias pueden interactuar con farley medicamento.  ¿A qué shannon estar atento al usar keke medicamento?  Informe a farley médico si merna síntomas no mejoran o si empeoran. Visite a farley médico o a farley profesional de la kamran para chequear farley evolución periódicamente. Debido que puede ser necesario rai keke medicamento jessica varias semanas para que sea posible observar merna efectos en forma completa, es importante que sigue farley tratamiento jackie recetado por farley médico.   Los pacientes y merna familias deben estar atentos si empeora la depresión o ideas suicidas. También esté atento a cambios repentinos o severos de emoción, tales jackie el sentirse ansioso, agitado, lleno de pánico, irritable, hostil, agresivo, impulsivo, inquietud severa, demasiado excitado y hiperactivo o dificultad para conciliar el sueño. Si esto ocurre, especialmente al comenzar con el tratamiento o al cambiar de dosis, comuníquese con farley médico.  Puede experimentar somnolencia o mareos. No conduzca ni utilice maquinaria, ni jose l nada que le exija permanecer en estado de alerta hasta que sepa cómo le afecta keke medicamento. No se siente ni se ponga de pie con rapidez, especialmente si es un paciente de edad avanzada. Formoso reduce el riesgo de mareos o desmayos. El alcohol puede interferir con el efecto de keke medicamento. Evite consumir bebidas alcohólicas.  Se le podrá secar la boca. Masticar chicle sin azúcar, chupar caramelos duros y rai agua en abundancia le ayudará a mantener la boca húmeda. Si el problema no desaparece o es severo, consulte a farley médico.  Keke medicamento puede afectar merna niveles de azúcar en la thien. Si tiene diabetes, consulte con farley médico o profesional de la kamran antes de cambiar farley dieta o la dosis de farley medicamento para la diabetes.  ¿Qué efectos secundarios puedo tener al utilizar keke medicamento?  Efectos secundarios que debe informar a farley médico o a farley profesional de la kamran tan pronto jackie sea  posible:  -reacciones alérgicas jackie erupción cutánea, picazón o urticarias, hinchazón de la kadie, labios o lengua  -problemas respiratorios  -confusión  -pulso cardíaco rápido o irregular, palpitaciones  -síntomas gripales con fiebre, escalofríos, tos, molestias y haritha musculares o articulares  -convulsiones  -ideas suicidas u otros cambios de humor  -temblores  -dificultad para conciliar el sueño  -sangrado, magulladuras inusuales  -cansancio o debilidad inusual  -vómito  Efectos secundarios que, por lo general, no requieren atención médica (debe informarlos a farley médico o a farley profesional de la kamran si persisten o si son molestos):  -visión borrosa  -cambios en el deseo sexual o capacidad  -diarrea  -boca seca  -enrojecimiento  -dolor de diego  -aumento o disminución del apetito  -náuseas  -sudoración  Puede ser que esta lista no menciona todos los posibles efectos secundarios. Comuníquese a farley médico por asesoramiento médico sobre los efectos secundarios. Usted puede informar los efectos secundarios a la FDA por teléfono al [facility].  ¿Dónde shannon guardar mi medicina?  Manténgala fuera del alcance de los niños.  Guárdela a temperatura ambiente, entre 15 y 30 grados C (59 y 86 grados F). Deseche todo el medicamento que no haya utilizado, después de la fecha de vencimiento.  ATENCIÓN: Keke folleto es un resumen. Puede ser que no cubra toda la posible información. Si usted tiene preguntas acerca de esta medicina, consulte con farley médico, farley farmacéutico o farley profesional de la kamran.  © 2014, [employer]. (5/23/2013 2:43:18 PM)

## 2017-12-08 ENCOUNTER — OFFICE VISIT (OUTPATIENT)
Dept: URGENT CARE | Facility: CLINIC | Age: 52
End: 2017-12-08
Payer: COMMERCIAL

## 2017-12-08 ENCOUNTER — HOSPITAL ENCOUNTER (OUTPATIENT)
Facility: MEDICAL CENTER | Age: 52
End: 2017-12-08
Attending: PHYSICIAN ASSISTANT
Payer: COMMERCIAL

## 2017-12-08 VITALS
BODY MASS INDEX: 33.31 KG/M2 | RESPIRATION RATE: 16 BRPM | SYSTOLIC BLOOD PRESSURE: 132 MMHG | DIASTOLIC BLOOD PRESSURE: 80 MMHG | WEIGHT: 188 LBS | TEMPERATURE: 97.7 F | HEIGHT: 63 IN | OXYGEN SATURATION: 98 % | HEART RATE: 72 BPM

## 2017-12-08 DIAGNOSIS — N12 PYELONEPHRITIS: ICD-10-CM

## 2017-12-08 DIAGNOSIS — E66.9 OBESITY (BMI 30-39.9): ICD-10-CM

## 2017-12-08 LAB
APPEARANCE UR: CLEAR
BILIRUB UR STRIP-MCNC: NORMAL MG/DL
COLOR UR AUTO: YELLOW
GLUCOSE UR STRIP.AUTO-MCNC: NORMAL MG/DL
KETONES UR STRIP.AUTO-MCNC: NORMAL MG/DL
LEUKOCYTE ESTERASE UR QL STRIP.AUTO: NORMAL
NITRITE UR QL STRIP.AUTO: NORMAL
PH UR STRIP.AUTO: 7 [PH] (ref 5–8)
PROT UR QL STRIP: 30 MG/DL
RBC UR QL AUTO: NORMAL
SP GR UR STRIP.AUTO: 1.01
UROBILINOGEN UR STRIP-MCNC: NORMAL MG/DL

## 2017-12-08 PROCEDURE — 87086 URINE CULTURE/COLONY COUNT: CPT

## 2017-12-08 PROCEDURE — 81002 URINALYSIS NONAUTO W/O SCOPE: CPT | Performed by: PHYSICIAN ASSISTANT

## 2017-12-08 PROCEDURE — 87077 CULTURE AEROBIC IDENTIFY: CPT

## 2017-12-08 PROCEDURE — 87186 SC STD MICRODIL/AGAR DIL: CPT

## 2017-12-08 PROCEDURE — 99214 OFFICE O/P EST MOD 30 MIN: CPT | Performed by: PHYSICIAN ASSISTANT

## 2017-12-08 RX ORDER — CEFTRIAXONE 1 G/1
1 INJECTION, POWDER, FOR SOLUTION INTRAMUSCULAR; INTRAVENOUS ONCE
Status: COMPLETED | OUTPATIENT
Start: 2017-12-08 | End: 2017-12-08

## 2017-12-08 RX ORDER — CIPROFLOXACIN 500 MG/1
500 TABLET, FILM COATED ORAL EVERY 12 HOURS
Qty: 14 TAB | Refills: 0 | Status: SHIPPED | OUTPATIENT
Start: 2017-12-08 | End: 2017-12-15

## 2017-12-08 RX ADMIN — CEFTRIAXONE 1 G: 1 INJECTION, POWDER, FOR SOLUTION INTRAMUSCULAR; INTRAVENOUS at 10:46

## 2017-12-08 ASSESSMENT — ENCOUNTER SYMPTOMS
VOMITING: 1
NAUSEA: 1
BACK PAIN: 0
FLANK PAIN: 1
FEVER: 0
ABDOMINAL PAIN: 1
PALPITATIONS: 0
SHORTNESS OF BREATH: 0
COUGH: 0
CHILLS: 0

## 2017-12-08 NOTE — PROGRESS NOTES
Subjective:      Lorena Bermudez is a 52 y.o. female who presents with UTI (dysuria x 3 days)            UTI   This is a new problem. The current episode started in the past 7 days. The problem occurs constantly. The problem has been gradually worsening. Associated symptoms include abdominal pain, nausea, urinary symptoms and vomiting. Pertinent negatives include no chest pain, chills, coughing or fever. Nothing aggravates the symptoms. She has tried nothing for the symptoms.       Review of Systems   Constitutional: Negative for chills and fever.   Respiratory: Negative for cough and shortness of breath.    Cardiovascular: Negative for chest pain and palpitations.   Gastrointestinal: Positive for abdominal pain, nausea and vomiting.   Genitourinary: Positive for dysuria, flank pain, frequency and urgency. Negative for hematuria.   Musculoskeletal: Negative for back pain.   All other systems reviewed and are negative.    PMH:  has a past medical history of Heart burn; Indigestion; Other specified disorder of intestines; and Psychiatric problem.  MEDS:   Current Outpatient Prescriptions:   •  ciprofloxacin (CIPRO) 500 MG Tab, Take 1 Tab by mouth every 12 hours for 7 days., Disp: 14 Tab, Rfl: 0  •  AZO-CRANBERRY PO, Take  by mouth., Disp: , Rfl:   •  losartan (COZAAR) 50 MG Tab, Take 50 mg by mouth every day., Disp: , Rfl:   •  metformin (GLUCOPHAGE) 500 MG Tab, Take 500 mg by mouth 2 times a day, with meals., Disp: , Rfl:   •  HYDROCHLOROTHIAZIDE PO, Take  by mouth., Disp: , Rfl:   •  propranolol (INDERAL) 20 MG TABS, Take 20 mg by mouth every day., Disp: , Rfl:   •  acetaminophen (TYLENOL) 325 MG TABS, Take 650 mg by mouth every four hours as needed., Disp: , Rfl:   •  enalapril (VASOTEC) 20 MG tablet, Take 20 mg by mouth every day., Disp: , Rfl:   •  propranolol (INDERAL) 20 MG TABS, Take 20 mg by mouth 3 times a day., Disp: , Rfl:   •  quetiapine (SEROQUEL) 25 MG TABS, Take 25 mg by mouth 2 times a day.,  "Disp: , Rfl:   •  fluoxetine (PROZAC) 20 MG CAPS, Take 20 mg by mouth every day., Disp: , Rfl:   •  simvastatin (ZOCOR) 20 MG TABS, Take 20 mg by mouth every evening., Disp: , Rfl:   •  clonazepam (KLONOPIN) 0.5 MG TABS, Take 0.25 mg by mouth 2 times a day., Disp: , Rfl:   •  enalapril (VASOTEC) 20 MG tablet, Take 20 mg by mouth every day., Disp: , Rfl:   •  clonazepam (KLONOPIN) 0.5 MG TABS, Take 0.25 mg by mouth 2 times a day., Disp: , Rfl:   •  fluoxetine (PROZAC) 20 MG CAPS, Take 20 mg by mouth 2 times a day., Disp: , Rfl:   ALLERGIES: No Known Allergies  SURGHX:   Past Surgical History:   Procedure Laterality Date   • LUDMILA BY LAPAROSCOPY  7/24/2014    Performed by Charles Diop M.D. at SURGERY [facility] ORS   • GYN SURGERY  2008    hysterectomy     SOCHX:  reports that she has never smoked. She has never used smokeless tobacco. She reports that she does not drink alcohol or use drugs.  FH: Family history was reviewed, no pertinent findings to report  Medications, Allergies, and current problem list reviewed today in Epic         Objective:     /80   Pulse 72   Temp 36.5 °C (97.7 °F)   Resp 16   Ht 1.6 m (5' 3\")   Wt 85.3 kg (188 lb)   SpO2 98%   Breastfeeding? No   BMI 33.30 kg/m²      Physical Exam   Constitutional: She is oriented to person, place, and time. She appears well-developed and well-nourished. She is active.  Non-toxic appearance. She does not have a sickly appearance. She does not appear ill. No distress.   HENT:   Head: Normocephalic and atraumatic.   Right Ear: External ear normal.   Left Ear: External ear normal.   Nose: Nose normal.   Mouth/Throat: Oropharynx is clear and moist.   Eyes: Conjunctivae, EOM and lids are normal.   Neck: Normal range of motion and full passive range of motion without pain. Neck supple.   Cardiovascular: Normal rate, regular rhythm, S1 normal, S2 normal and normal heart sounds.  Exam reveals no gallop and no friction rub.    No murmur " heard.  Pulmonary/Chest: Effort normal and breath sounds normal. No respiratory distress. She has no decreased breath sounds. She has no wheezes. She has no rales. She exhibits no tenderness.   Abdominal: Soft. Normal appearance and bowel sounds are normal. She exhibits no shifting dullness, no distension, no pulsatile liver, no fluid wave, no abdominal bruit, no ascites, no pulsatile midline mass and no mass. There is no tenderness. There is no rigidity, no rebound, no guarding, no CVA tenderness, no tenderness at McBurney's point and negative Clayton's sign.   Musculoskeletal: Normal range of motion. She exhibits no edema, tenderness or deformity.    CVA tenderness present bilaterally   Neurological: She is alert and oriented to person, place, and time.   Skin: Skin is warm, dry and intact.   Psychiatric: She has a normal mood and affect. Her speech is normal and behavior is normal. Judgment and thought content normal.   Vitals reviewed.              Assessment/Plan:     1. Pyelonephritis    - POCT Urinalysis  - Urine Culture; Future  - ciprofloxacin (CIPRO) 500 MG Tab; Take 1 Tab by mouth every 12 hours for 7 days.  Dispense: 14 Tab; Refill: 0  - cefTRIAXone (ROCEPHIN) injection 1 g; 1,000 mg by Intramuscular route Once.    2. Obesity (BMI 30-39.9)    - Patient identified as having weight management issue.  Appropriate orders and counseling given.    Differential diagnosis, natural history, supportive care, and indications for immediate follow-up discussed at length.   Follow-up with primary care provider within 4-5 days, emergency room precautions discussed.  Patient and/or family appears understanding of information.

## 2017-12-08 NOTE — PATIENT INSTRUCTIONS
Pielonefritis - Adultos   (Pyelonephritis, Adult)   La pielonefritis es bobby infección del riñón. Hay dos tipos principales de pielonefritis:   · Bobby infección que se inicia rápidamente sin síntomas previos (pielonefritis aguda).  · Infecciones que persisten por un caity período (pielonefritis crónica).  CAUSAS   Hay dos causas principales:   · Pasaje de bacterias desde la vejiga al riñón. Keke problema aparece especialmente en mujeres embarazadas. La orina en la vejiga puede infectarse por diferentes causas, por ejemplo:  ¨ Inflamación de la próstata (prostatitis).  ¨ Jessica las relaciones sexuales en las mujeres.  ¨ Infección en la vejiga (cistitis).  · Pasaje de bacterias desde la thien hacia el riñón.  Las enfermedades que aumentan el riesgo son:   · Diabetes.  · Cálculos renales o en la vesícula.  · Cáncer.  · Un catéter colocado en la vejiga.  · Otras anormalidades del riñón o de la uretra.  SÍNTOMAS   · Dolor abdominal  · Dolor en la maude del costado o flanco.  · Fiebre.  · Escalofríos.  · Malestar estomacal.  · Thien en la orina (orina oscura).  · Necesidad frecuente de orinar  · Necesidad intensa o persistente de orinar.  · Sensación de ardor o pinchazos al orinar.  DIAGNÓSTICO   El médico diagnosticará bobby infección en farley riñón basándose en los síntomas. También tomará bobby muestra de orina.   TRATAMIENTO   Generalmente el tratamiento depende de la gravedad de la infección.   · Si la infección es leve y se diagnostica a tiempo, el médico lo tratará con antibióticos por vía oral y lo dejará irse a farley casa.  · Si la infección es más grave, la bacteria podría aramis ingresado al torrente sanguíneo. Anton Ruiz requerirá antibióticos por vía intravenosa y la permanencia en el hospital. Los síntomas pueden incluir:  ¨ Fiebre sameer.  ¨ Dolor intenso en un costado del cuerpo.  ¨ Escalofríos  · Aún después de aramis permanecido en el hospital, el médico podrá indicarle antibióticos por vía oral jessica cierto período de  tiempo.  · Podrá prescribirle otros tratamientos según la causa de la infección.  INSTRUCCIONES PARA EL CUIDADO EN EL HOGAR   · [place] los antibióticos jackie se le indicó. Tómelos todos, aunque se sienta mejor.  · Concurra para realizar un control y asegurarse de que la infección peterson desaparecido.  · Debe ingerir gran cantidad de líquido para mantener la orina de james milton o color amarillo pálido.  · [place] medicamentos para la vejiga si siente urgencia para orinar o lo hace con mucha frecuencia.  SOLICITE ATENCIÓN MÉDICA DE INMEDIATO SI:   · Tiene fiebre o síntomas persistentes jessica más de 2 ó 3 domitila.  · Tiene fiebre y los síntomas empeoran.  · No puede rai los antibióticos ni ingerir líquidos.  · Comienza a sentir escalofríos.  · Siente debilidad extrema o se desmaya.  · No mejora después de 2 domitila de tratamiento.  ASEGÚRESE DE QUE:   · Comprende estas instrucciones.  · Controlará farley enfermedad.  · Solicitará ayuda de inmediato si no mejora o empeora.     Esta información no tiene jackie fin reemplazar el consejo del médico. Asegúrese de hacerle al médico cualquier pregunta que tenga.     Document Released: 09/27/2006 Document Revised: 06/18/2013  [employer] Interactive Patient Education ©2016 [employer]

## 2017-12-10 LAB
BACTERIA UR CULT: ABNORMAL
BACTERIA UR CULT: ABNORMAL
SIGNIFICANT IND 70042: ABNORMAL
SOURCE SOURCE: ABNORMAL

## 2017-12-12 ENCOUNTER — APPOINTMENT (OUTPATIENT)
Dept: SLEEP MEDICINE | Facility: MEDICAL CENTER | Age: 52
End: 2017-12-12
Payer: COMMERCIAL

## 2018-05-08 VITALS
WEIGHT: 171 LBS | DIASTOLIC BLOOD PRESSURE: 88 MMHG | HEIGHT: 62 IN | SYSTOLIC BLOOD PRESSURE: 120 MMHG | RESPIRATION RATE: 16 BRPM | BODY MASS INDEX: 31.47 KG/M2 | TEMPERATURE: 98.4 F | HEART RATE: 56 BPM

## 2018-05-23 ENCOUNTER — SLEEP CENTER VISIT (OUTPATIENT)
Dept: SLEEP MEDICINE | Facility: MEDICAL CENTER | Age: 53
End: 2018-05-23
Payer: COMMERCIAL

## 2018-05-23 VITALS
SYSTOLIC BLOOD PRESSURE: 138 MMHG | DIASTOLIC BLOOD PRESSURE: 80 MMHG | WEIGHT: 189 LBS | RESPIRATION RATE: 15 BRPM | OXYGEN SATURATION: 93 % | BODY MASS INDEX: 33.49 KG/M2 | HEART RATE: 93 BPM | HEIGHT: 63 IN

## 2018-05-23 DIAGNOSIS — E66.9 OBESITY (BMI 30-39.9): ICD-10-CM

## 2018-05-23 DIAGNOSIS — Z78.9 NON-SMOKER: ICD-10-CM

## 2018-05-23 DIAGNOSIS — G47.33 OSA (OBSTRUCTIVE SLEEP APNEA): ICD-10-CM

## 2018-05-23 PROCEDURE — 99214 OFFICE O/P EST MOD 30 MIN: CPT | Performed by: INTERNAL MEDICINE

## 2018-05-23 NOTE — PROGRESS NOTES
CC: Follow up for sleep disturbance    HPI:  Ms. Lorena Lyle is a 53 y/o F who was last seen by PMA in November 2015. PSG 11/6/15 showed an AHI of 33.9 with a jorgito oxygen saturation of 77%.    Cpap titration was recommended but was too expensive so she never returned and was lost to f/u until now. Has been using an O2 concentrator. Discussed PAP titration versus autopap which prefer.  Ghada assissted with communication.          Patient Active Problem List    Diagnosis Date Noted   • JACKIE (obstructive sleep apnea) 05/23/2018   • Obesity (BMI 30-39.9) 12/08/2017   • Thrombocytopenia, idiopathic (HCC) 08/05/2014   • Other specified pre-operative examination 07/24/2014       Past Medical History:   Diagnosis Date   • Depression    • Heart burn    • Hypertension    • Indigestion    • Other specified disorder of intestines    • Psychiatric problem     DEPRESSION        Past Surgical History:   Procedure Laterality Date   • LUDMILA BY LAPAROSCOPY  7/24/2014    Performed by Charles Diop M.D. at SURGERY [facility]   • GYN SURGERY  2008    hysterectomy   • COLONOSCOPY     • ENDOSCOPY         Family History   Problem Relation Age of Onset   • Sleep Apnea Neg Hx        Social History     Social History   • Marital status:      Spouse name: N/A   • Number of children: N/A   • Years of education: N/A     Occupational History   • Not on file.     Social History Main Topics   • Smoking status: Never Smoker   • Smokeless tobacco: Never Used   • Alcohol use No   • Drug use: No   • Sexual activity: Not on file     Other Topics Concern   • Not on file     Social History Narrative    ** Merged History Encounter **            Current Outpatient Prescriptions   Medication Sig Dispense Refill   • LISINOPRIL PO Take  by mouth.     • losartan (COZAAR) 50 MG Tab Take 50 mg by mouth every day.     • propranolol (INDERAL) 20 MG TABS Take 20 mg by mouth every day.     • fluoxetine (PROZAC) 20 MG CAPS Take 20 mg by mouth  "every day.     • clonazepam (KLONOPIN) 0.5 MG TABS Take 0.25 mg by mouth 2 times a day.     • AZO-CRANBERRY PO Take  by mouth.     • metformin (GLUCOPHAGE) 500 MG Tab Take 500 mg by mouth 2 times a day, with meals.     • HYDROCHLOROTHIAZIDE PO Take  by mouth.     • enalapril (VASOTEC) 20 MG tablet Take 20 mg by mouth every day.     • clonazepam (KLONOPIN) 0.5 MG TABS Take 0.25 mg by mouth 2 times a day.     • fluoxetine (PROZAC) 20 MG CAPS Take 20 mg by mouth 2 times a day.     • acetaminophen (TYLENOL) 325 MG TABS Take 650 mg by mouth every four hours as needed.     • enalapril (VASOTEC) 20 MG tablet Take 20 mg by mouth every day.     • propranolol (INDERAL) 20 MG TABS Take 20 mg by mouth 3 times a day.     • quetiapine (SEROQUEL) 25 MG TABS Take 25 mg by mouth 2 times a day.     • simvastatin (ZOCOR) 20 MG TABS Take 20 mg by mouth every evening.       No current facility-administered medications for this visit.     \"CURRENT RX\"    ALLERGIES: Patient has no known allergies.    ROS  Constitutional: Denies fever, chills, sweats,  weight loss, fatigue  Cardiovascular: Denies chest pain, tightness, palpitations, swelling in legs/feet, fainting, difficulty breathing when lying down but gets better when sitting up.   Respiratory: Denies shortness of breath, cough, sputum, wheezing, painful breathing, coughing up blood.   Sleep: per HPI  Gastrointestinal: Denies  difficulty swallowing, nausea, abdominal pain, diarrhea, constipation, heartburn. + bloating  Musculoskeletal: Denies painful joints, sore muscles, back pain.        PHYSICAL EXAM  MSEW    /80   Pulse 93   Resp 15   Ht 1.6 m (5' 3\")   Wt 85.7 kg (189 lb)   SpO2 93%   BMI 33.48 kg/m²   Appearance: Well-nourished, well-developed, no acute distress  Eyes:  PERRLA, EOMI  ENMT: without lesions, deformities;hearing grossly intact; tongue normal, posterior pharynx without erythema or exudate; Mallampati classification: 4  Neck: Supple, trachea midline, no " masses  Respiratory effort:  No intercostal retractions or use of accessory muscles  Lung auscultation:  No wheezes rhonchi rubs or rales  Cardiac: No murmurs, rubs, or gallops; regular rhythm, normal rate; slight edema  Abdomen:  No tenderness, no organomegaly  Musculoskeletal:  Grossly normal; gait and station normal; digits and nails normal  Skin:  No rashes, petechiae, cyanosis  Neurologic: without focal signs; oriented to person, time, place, and purpose; judgement intact  Psychiatric:  No depression, anxiety, agitation        PROBLEMS:  1. JACKIE (obstructive sleep apnea)  - DME CPAP    2. Non-smoker - stopped completely 5 years ago      3. Obesity (BMI 30-39.9)    - OBESITY COUNSELING (No Charge): Patient identified as having weight management issue.  Appropriate orders and counseling given.      PLAN:   Will prescribe auto-titrating cpap 5-20 cmH2O using a mask of choice followed by clinical and comp card review in 10 weeks.    Return in about 10 weeks (around 8/1/2018).

## 2018-05-30 ENCOUNTER — HOSPITAL ENCOUNTER (INPATIENT)
Dept: HOSPITAL 8 - ED | Age: 53
LOS: 2 days | Discharge: HOME | DRG: 305 | End: 2018-06-01
Attending: INTERNAL MEDICINE | Admitting: INTERNAL MEDICINE
Payer: COMMERCIAL

## 2018-05-30 VITALS — SYSTOLIC BLOOD PRESSURE: 151 MMHG | DIASTOLIC BLOOD PRESSURE: 98 MMHG

## 2018-05-30 VITALS — WEIGHT: 222.67 LBS | HEIGHT: 62 IN | BODY MASS INDEX: 40.98 KG/M2

## 2018-05-30 VITALS — SYSTOLIC BLOOD PRESSURE: 174 MMHG | DIASTOLIC BLOOD PRESSURE: 99 MMHG

## 2018-05-30 DIAGNOSIS — K21.9: ICD-10-CM

## 2018-05-30 DIAGNOSIS — I10: ICD-10-CM

## 2018-05-30 DIAGNOSIS — Z79.899: ICD-10-CM

## 2018-05-30 DIAGNOSIS — R73.03: ICD-10-CM

## 2018-05-30 DIAGNOSIS — I16.0: Primary | ICD-10-CM

## 2018-05-30 DIAGNOSIS — Z87.891: ICD-10-CM

## 2018-05-30 DIAGNOSIS — Z90.710: ICD-10-CM

## 2018-05-30 DIAGNOSIS — Z79.82: ICD-10-CM

## 2018-05-30 DIAGNOSIS — E78.5: ICD-10-CM

## 2018-05-30 DIAGNOSIS — G43.909: ICD-10-CM

## 2018-05-30 DIAGNOSIS — D69.6: ICD-10-CM

## 2018-05-30 DIAGNOSIS — E66.01: ICD-10-CM

## 2018-05-30 LAB
<PLATELET ESTIMATE>: (no result)
ALBUMIN SERPL-MCNC: 3.7 G/DL (ref 3.4–5)
ALP SERPL-CCNC: 84 U/L (ref 45–117)
ALT SERPL-CCNC: 41 U/L (ref 12–78)
ANION GAP SERPL CALC-SCNC: 7 MMOL/L (ref 5–15)
BAND#(MANUAL): 0.08 X10^3/UL
BASOPHILS NFR BLD MANUAL: 2 % (ref 0–1)
BASOS#(MANUAL): 0.16 X10^3/UL (ref 0–0.1)
BILIRUB DIRECT SERPL-MCNC: NORMAL MG/DL
BILIRUB SERPL-MCNC: 1 MG/DL (ref 0.2–1)
CALCIUM SERPL-MCNC: 8.4 MG/DL (ref 8.5–10.1)
CHLORIDE SERPL-SCNC: 108 MMOL/L (ref 98–107)
CREAT SERPL-MCNC: 0.64 MG/DL (ref 0.55–1.02)
EOS#(MANUAL): 0.24 X10^3/UL (ref 0–0.4)
EOS% (MANUAL): 3 % (ref 1–7)
ERYTHROCYTE [DISTWIDTH] IN BLOOD BY AUTOMATED COUNT: 12.9 % (ref 9.6–15.2)
LG PLATELETS BLD QL SMEAR: (no result)
LYMPH#(MANUAL): 1.9 X10^3/UL (ref 1–3.4)
LYMPHS% (MANUAL): 24 % (ref 22–44)
MCH RBC QN AUTO: 32.7 PG (ref 27–34.8)
MCHC RBC AUTO-ENTMCNC: 34.3 G/DL (ref 32.4–35.8)
MCV RBC AUTO: 95.3 FL (ref 80–100)
MD: YES
MONOS#(MANUAL): 0.71 X10^3/UL (ref 0.3–2.7)
MONOS% (MANUAL): 9 % (ref 2–9)
NEUTS BAND NFR BLD: 1 % (ref 0–7)
PLATELET # BLD AUTO: 108 X10^3/UL (ref 130–400)
PMV BLD AUTO: 14.4 FL (ref 7.4–10.4)
PROT SERPL-MCNC: 7.6 G/DL (ref 6.4–8.2)
RBC # BLD AUTO: 4.58 X10^6/UL (ref 3.82–5.3)
REACTIVE LYMPHS # (MANUAL): 0.24 X10^3/UL (ref 0–0)
REACTIVE LYMPHS % (MANUAL): 3 % (ref 0–0)
SEG#(MANUAL): 4.58 X10^3/UL (ref 1.8–6.8)
SEGS% (MANUAL): 58 % (ref 42–75)
TROPONIN I SERPL-MCNC: < 0.015 NG/ML (ref 0–0.04)
TROPONIN I SERPL-MCNC: < 0.015 NG/ML (ref 0–0.04)

## 2018-05-30 PROCEDURE — 71045 X-RAY EXAM CHEST 1 VIEW: CPT

## 2018-05-30 PROCEDURE — 83735 ASSAY OF MAGNESIUM: CPT

## 2018-05-30 PROCEDURE — 36415 COLL VENOUS BLD VENIPUNCTURE: CPT

## 2018-05-30 PROCEDURE — 85025 COMPLETE CBC W/AUTO DIFF WBC: CPT

## 2018-05-30 PROCEDURE — 84443 ASSAY THYROID STIM HORMONE: CPT

## 2018-05-30 PROCEDURE — 80061 LIPID PANEL: CPT

## 2018-05-30 PROCEDURE — A9502 TC99M TETROFOSMIN: HCPCS

## 2018-05-30 PROCEDURE — 93005 ELECTROCARDIOGRAM TRACING: CPT

## 2018-05-30 PROCEDURE — 80053 COMPREHEN METABOLIC PANEL: CPT

## 2018-05-30 PROCEDURE — 84484 ASSAY OF TROPONIN QUANT: CPT

## 2018-05-30 PROCEDURE — 84100 ASSAY OF PHOSPHORUS: CPT

## 2018-05-30 PROCEDURE — 93017 CV STRESS TEST TRACING ONLY: CPT

## 2018-05-30 PROCEDURE — 78452 HT MUSCLE IMAGE SPECT MULT: CPT

## 2018-05-30 PROCEDURE — C9898 INPNT STAY RADIOLABELED ITEM: HCPCS

## 2018-05-30 PROCEDURE — 96374 THER/PROPH/DIAG INJ IV PUSH: CPT

## 2018-05-30 RX ADMIN — KETOROLAC TROMETHAMINE PRN MG: 30 INJECTION, SOLUTION INTRAMUSCULAR at 20:44

## 2018-05-30 RX ADMIN — ENOXAPARIN SODIUM SCH MG: 40 INJECTION SUBCUTANEOUS at 22:20

## 2018-05-30 RX ADMIN — ATORVASTATIN CALCIUM SCH MG: 40 TABLET, FILM COATED ORAL at 20:43

## 2018-05-30 RX ADMIN — FAMOTIDINE SCH MG: 20 TABLET, FILM COATED ORAL at 20:43

## 2018-05-30 RX ADMIN — BUTALBITAL, ACETAMINOPHEN, AND CAFFEINE PRN TAB: 50; 325; 40 TABLET ORAL at 22:20

## 2018-05-31 VITALS — DIASTOLIC BLOOD PRESSURE: 78 MMHG | SYSTOLIC BLOOD PRESSURE: 148 MMHG

## 2018-05-31 VITALS — DIASTOLIC BLOOD PRESSURE: 84 MMHG | SYSTOLIC BLOOD PRESSURE: 133 MMHG

## 2018-05-31 VITALS — DIASTOLIC BLOOD PRESSURE: 88 MMHG | SYSTOLIC BLOOD PRESSURE: 138 MMHG

## 2018-05-31 VITALS — DIASTOLIC BLOOD PRESSURE: 79 MMHG | SYSTOLIC BLOOD PRESSURE: 125 MMHG

## 2018-05-31 VITALS — DIASTOLIC BLOOD PRESSURE: 85 MMHG | SYSTOLIC BLOOD PRESSURE: 135 MMHG

## 2018-05-31 LAB
CHOL/HDL RATIO: 5.1
HDL CHOL %: 20 % (ref 28–40)
HDL CHOLESTEROL (DIRECT): 33 MG/DL (ref 40–60)
LDL CHOLESTEROL,CALCULATED: 61 MG/DL (ref 54–169)
LDLC/HDLC SERPL: 1.8 {RATIO} (ref 0.5–3)
TRIGL SERPL-MCNC: 376 MG/DL (ref 50–200)
TROPONIN I SERPL-MCNC: < 0.015 NG/ML (ref 0–0.04)
TSH SERPL-ACNC: 0.96 MIU/L (ref 0.36–3.74)
VLDLC SERPL CALC-MCNC: 75 MG/DL (ref 0–25)

## 2018-05-31 RX ADMIN — FAMOTIDINE SCH MG: 20 TABLET, FILM COATED ORAL at 20:11

## 2018-05-31 RX ADMIN — FLUOXETINE HYDROCHLORIDE SCH MG: 20 CAPSULE ORAL at 08:37

## 2018-05-31 RX ADMIN — ATORVASTATIN CALCIUM SCH MG: 40 TABLET, FILM COATED ORAL at 20:11

## 2018-05-31 RX ADMIN — ENOXAPARIN SODIUM SCH MG: 40 INJECTION SUBCUTANEOUS at 20:11

## 2018-05-31 RX ADMIN — BACLOFEN SCH MG: 10 TABLET ORAL at 08:36

## 2018-05-31 RX ADMIN — FAMOTIDINE SCH MG: 20 TABLET, FILM COATED ORAL at 08:32

## 2018-05-31 RX ADMIN — KETOROLAC TROMETHAMINE PRN MG: 30 INJECTION, SOLUTION INTRAMUSCULAR at 13:33

## 2018-05-31 RX ADMIN — BUTALBITAL, ACETAMINOPHEN, AND CAFFEINE PRN TAB: 50; 325; 40 TABLET ORAL at 20:11

## 2018-05-31 RX ADMIN — LISINOPRIL SCH MG: 20 TABLET ORAL at 08:47

## 2018-05-31 RX ADMIN — PROPRANOLOL HYDROCHLORIDE SCH MG: 20 TABLET ORAL at 08:31

## 2018-06-01 VITALS — DIASTOLIC BLOOD PRESSURE: 84 MMHG | SYSTOLIC BLOOD PRESSURE: 144 MMHG

## 2018-06-01 VITALS — DIASTOLIC BLOOD PRESSURE: 90 MMHG | SYSTOLIC BLOOD PRESSURE: 122 MMHG

## 2018-06-01 VITALS — DIASTOLIC BLOOD PRESSURE: 82 MMHG | SYSTOLIC BLOOD PRESSURE: 144 MMHG

## 2018-06-01 LAB
<PLATELET ESTIMATE>: (no result)
ALBUMIN SERPL-MCNC: 3.3 G/DL (ref 3.4–5)
ALP SERPL-CCNC: 74 U/L (ref 45–117)
ALT SERPL-CCNC: 44 U/L (ref 12–78)
ANION GAP SERPL CALC-SCNC: 4 MMOL/L (ref 5–15)
BILIRUB DIRECT SERPL-MCNC: NORMAL MG/DL
BILIRUB SERPL-MCNC: 1 MG/DL (ref 0.2–1)
CALCIUM SERPL-MCNC: 8.1 MG/DL (ref 8.5–10.1)
CHLORIDE SERPL-SCNC: 109 MMOL/L (ref 98–107)
CREAT SERPL-MCNC: 0.67 MG/DL (ref 0.55–1.02)
EOS#(MANUAL): 0.13 X10^3/UL (ref 0–0.4)
EOS% (MANUAL): 2 % (ref 1–7)
ERYTHROCYTE [DISTWIDTH] IN BLOOD BY AUTOMATED COUNT: 13.4 % (ref 9.6–15.2)
LG PLATELETS BLD QL SMEAR: (no result)
LYMPH#(MANUAL): 2.5 X10^3/UL (ref 1–3.4)
LYMPHS% (MANUAL): 39 % (ref 22–44)
MCH RBC QN AUTO: 32.7 PG (ref 27–34.8)
MCHC RBC AUTO-ENTMCNC: 34.1 G/DL (ref 32.4–35.8)
MCV RBC AUTO: 96 FL (ref 80–100)
MD: YES
MONOS#(MANUAL): 0.32 X10^3/UL (ref 0.3–2.7)
MONOS% (MANUAL): 5 % (ref 2–9)
PLATELET # BLD AUTO: 93 X10^3/UL (ref 130–400)
PMV BLD AUTO: 15.2 FL (ref 7.4–10.4)
PROT SERPL-MCNC: 6.9 G/DL (ref 6.4–8.2)
RBC # BLD AUTO: 4.25 X10^6/UL (ref 3.82–5.3)
REACTIVE LYMPHS # (MANUAL): 0.13 X10^3/UL (ref 0–0)
REACTIVE LYMPHS % (MANUAL): 2 % (ref 0–0)
SEG#(MANUAL): 3.33 X10^3/UL (ref 1.8–6.8)
SEGS% (MANUAL): 52 % (ref 42–75)

## 2018-06-01 RX ADMIN — FAMOTIDINE SCH MG: 20 TABLET, FILM COATED ORAL at 08:15

## 2018-06-01 RX ADMIN — LISINOPRIL SCH MG: 20 TABLET ORAL at 08:15

## 2018-06-01 RX ADMIN — BUTALBITAL, ACETAMINOPHEN, AND CAFFEINE PRN TAB: 50; 325; 40 TABLET ORAL at 09:37

## 2018-06-01 RX ADMIN — PROPRANOLOL HYDROCHLORIDE SCH MG: 20 TABLET ORAL at 08:16

## 2018-06-01 RX ADMIN — FLUOXETINE HYDROCHLORIDE SCH MG: 20 CAPSULE ORAL at 08:15

## 2018-06-01 RX ADMIN — BACLOFEN SCH MG: 10 TABLET ORAL at 08:16

## 2022-07-21 ENCOUNTER — HOSPITAL ENCOUNTER (OUTPATIENT)
Dept: RADIOLOGY | Facility: MEDICAL CENTER | Age: 57
End: 2022-07-21
Attending: FAMILY MEDICINE
Payer: COMMERCIAL

## 2022-07-21 DIAGNOSIS — R74.01 TRANSAMINITIS: ICD-10-CM

## 2023-03-08 ENCOUNTER — TELEPHONE (OUTPATIENT)
Dept: HEALTH INFORMATION MANAGEMENT | Facility: OTHER | Age: 58
End: 2023-03-08
Payer: COMMERCIAL

## 2023-09-20 ENCOUNTER — OFFICE VISIT (OUTPATIENT)
Dept: SLEEP MEDICINE | Facility: MEDICAL CENTER | Age: 58
End: 2023-09-20
Attending: PREVENTIVE MEDICINE
Payer: COMMERCIAL

## 2023-09-20 VITALS
RESPIRATION RATE: 16 BRPM | WEIGHT: 185.9 LBS | DIASTOLIC BLOOD PRESSURE: 82 MMHG | HEART RATE: 100 BPM | HEIGHT: 62 IN | OXYGEN SATURATION: 93 % | BODY MASS INDEX: 34.21 KG/M2 | SYSTOLIC BLOOD PRESSURE: 120 MMHG

## 2023-09-20 DIAGNOSIS — R06.83 SNORING: ICD-10-CM

## 2023-09-20 DIAGNOSIS — G47.33 OSA (OBSTRUCTIVE SLEEP APNEA): ICD-10-CM

## 2023-09-20 DIAGNOSIS — R06.81 WITNESSED EPISODE OF APNEA: ICD-10-CM

## 2023-09-20 PROCEDURE — 3079F DIAST BP 80-89 MM HG: CPT | Performed by: PREVENTIVE MEDICINE

## 2023-09-20 PROCEDURE — 3074F SYST BP LT 130 MM HG: CPT | Performed by: PREVENTIVE MEDICINE

## 2023-09-20 PROCEDURE — 99202 OFFICE O/P NEW SF 15 MIN: CPT | Performed by: PREVENTIVE MEDICINE

## 2023-09-20 PROCEDURE — 99204 OFFICE O/P NEW MOD 45 MIN: CPT | Performed by: PREVENTIVE MEDICINE

## 2023-09-20 NOTE — PROGRESS NOTES
"CHIEF COMPLIANT: \"Establish care.\"  HISTORY OF PRESENT ILLNESS:  Lorena Bermudez is a 58 y.o. female kindly referred by Tesha Howard M.D. and  is here for a sleep medicine consultation.     Sleep History:  Lorena Bermudez has been tested for sleep apnea   See below for results of Sleep Study done through [facility] in 2015.  Patient was started on therapy but stopped it.   The patient reports loud snoring, poor quality sleep, and not enough sleep.    The patient goes to bed at 8 -10 pm and wakes up at 6-7 am. It usually takes the patient approximately 60 mins mins to fall asleep. The patient does feel refreshed and does  nap during the day.( 1 hour  around 3:30 )  This pt is a former smoker.  The patient smoked  1 pack per week  for 20 years and quit 11 years ago. . Pt does not use cannabis.  This pt does  NOT drink  alcoholic beverages and has 1 caffeinated beverages daily.     The patient denies any symptoms of narcolepsy such as sleep paralysis or cataplexy, or any symptoms that suggest parasomnias such as sleep walking or acting out of dreams.    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     Endorses family members who use PAP therapy/snore:   UNKNOWN    EPWORTH SCORE:    0  /24, this is   NOT  consistent with EDS    SLEEP STUDY - [facility]  TYPE:  In lab PSG  DATE:  11/26/2015  Diagnostic:AHI:  33.9  AHI, Supine 51.4  Diagnostic  Oxygen Sundeep: 77% with 88 mins at or under 88%       Significant comorbidities and modifying factors: see below    PAST MEDICAL HISTORY:  Past Medical History:   Diagnosis Date    Depression     Heart burn     Hypertension     Indigestion     Other specified disorder of intestines     Psychiatric problem     DEPRESSION      PROBLEM LIST:  Patient Active Problem List    Diagnosis Date Noted    JACKIE (obstructive sleep apnea) 05/23/2018    Obesity (BMI 30-39.9) 12/08/2017    Thrombocytopenia, idiopathic (HCC) 08/05/2014    Other specified pre-operative examination 07/24/2014     PAST SOCIAL " HISTORY:  Past Surgical History:   Procedure Laterality Date    LUDMILA BY LAPAROSCOPY  7/24/2014    Performed by Charles Diop M.D. at SURGERY [facility]    GYN SURGERY  2008    hysterectomy    COLONOSCOPY      ENDOSCOPY       PAST FAMILY HISTORY:  Family History   Problem Relation Age of Onset    Sleep Apnea Neg Hx      SOCIAL HISTORY:  Social History     Socioeconomic History    Marital status:      Spouse name: Not on file    Number of children: Not on file    Years of education: Not on file    Highest education level: Not on file   Occupational History    Not on file   Tobacco Use    Smoking status: Never    Smokeless tobacco: Never   Vaping Use    Vaping Use: Never used   Substance and Sexual Activity    Alcohol use: No    Drug use: No    Sexual activity: Not on file   Other Topics Concern    Not on file   Social History Narrative    ** Merged History Encounter **          Social Determinants of Health     Financial Resource Strain: Not on file   Food Insecurity: Not on file   Transportation Needs: Not on file   Physical Activity: Not on file   Stress: Not on file   Social Connections: Not on file   Intimate Partner Violence: Not on file   Housing Stability: Not on file     ALLERGIES: Patient has no known allergies.  MEDICATIONS:  Current Outpatient Medications   Medication Sig Dispense Refill    LISINOPRIL PO Take  by mouth.      AZO-CRANBERRY PO Take  by mouth.      losartan (COZAAR) 50 MG Tab Take 50 mg by mouth every day.      propranolol (INDERAL) 20 MG TABS Take 20 mg by mouth every day.      fluoxetine (PROZAC) 20 MG CAPS Take 20 mg by mouth every day.      clonazepam (KLONOPIN) 0.5 MG TABS Take 0.25 mg by mouth 2 times a day.      metformin (GLUCOPHAGE) 500 MG Tab Take 500 mg by mouth 2 times a day, with meals. (Patient not taking: Reported on 9/20/2023)      HYDROCHLOROTHIAZIDE PO Take  by mouth. (Patient not taking: Reported on 9/20/2023)      enalapril (VASOTEC) 20 MG tablet Take 20  "mg by mouth every day. (Patient not taking: Reported on 9/20/2023)      clonazepam (KLONOPIN) 0.5 MG TABS Take 0.25 mg by mouth 2 times a day. (Patient not taking: Reported on 9/20/2023)      fluoxetine (PROZAC) 20 MG CAPS Take 20 mg by mouth 2 times a day. (Patient not taking: Reported on 9/20/2023)      acetaminophen (TYLENOL) 325 MG TABS Take 650 mg by mouth every four hours as needed. (Patient not taking: Reported on 9/20/2023)      enalapril (VASOTEC) 20 MG tablet Take 20 mg by mouth every day. (Patient not taking: Reported on 9/20/2023)      propranolol (INDERAL) 20 MG TABS Take 20 mg by mouth 3 times a day. (Patient not taking: Reported on 9/20/2023)      quetiapine (SEROQUEL) 25 MG TABS Take 25 mg by mouth 2 times a day. (Patient not taking: Reported on 9/20/2023)      simvastatin (ZOCOR) 20 MG TABS Take 20 mg by mouth every evening. (Patient not taking: Reported on 9/20/2023)       No current facility-administered medications for this visit.    \"CURRENT RX\"    REVIEW OF SYSTEMS:  Constitutional: Denies weight loss, endorses chronic daytime fatigue --also see HPI    PHYSICAL EXAM/VITALS:  /82 (BP Location: Left arm, Patient Position: Sitting, BP Cuff Size: Large adult)   Pulse 100   Resp 16   Ht 1.575 m (5' 2\")   Wt 84.3 kg (185 lb 14.4 oz)   SpO2 93%   BMI 34.00 kg/m²   Neck circumference (inches): 16.5  Appearance: Well-nourished, well-developed,  looks stated age, no acute distress  Eyes:   EOMI  ENMT: Mallampati:4    Neck: Supple, trachea midline  Respiratory effort:  No intercostal retractions or use of accessory muscles  Lung auscultation:  No wheezes rhonchi rubs or rales  Cardiac: No murmurs, rubs, or gallops; regular rhythm, normal rate; no edema  Musculoskeletal:  Grossly normal; gait and station normal  Neurologic:  oriented to person, time, place, and purpose; judgement intact  Psychiatric:  No depression, anxiety, agitation    MEDICAL DECISION MAKING:  The medical record was reviewed " as it pertains to this referral. This includes records from primary care,consultants notes, referral request, hospital records, labs and imaging. Any available diagnostic and titration nocturnal polysomnograms, home sleep apnea tests, continuous nocturnal oximetry results, multiple sleep latency tests, and recent compliance reports were reviewed with the patient.    ASSESSMENT/PLAN:  Lorena Bermudez is a 58 y.o. female  who has been diagnosed with JACKIE in the past.   HST's often underestimate sleep apnea in women. A negative HST should be followed by a laboratory sleep study (polysomnography) if the clinical suspicion for  sleep apnea is high. In women, the common co-occurrence of insomnia and JACKIE is frequently reported.    DIAGNOSES :    1. JACKIE (obstructive sleep apnea)  - Overnight Home Sleep Study; Future    2. Snoring  - Overnight Home Sleep Study; Future    3. Witnessed episode of apnea  - Overnight Home Sleep Study; Future    The patient has signs and symptoms consistent with obstructive sleep apnea hypopnea syndrome. Will schedule a nocturnal polysomnogram or a home sleep apnea test (please see plan).  The risks of untreated sleep apnea were discussed with the patient at length. Patients with JACKIE are at increased risk of cardiovascular disease including coronary artery disease, systemic arterial hypertension, pulmonary arterial hypertension, cardiac arrhythmias, and stroke. JACKIE patients have an increased risk of motor vehicle accidents, type 2 diabetes, chronic kidney disease, and non-alcoholic liver disease. The patient was advised to avoid driving a motor vehicle when drowsy.  Have advised the patient to follow up with the appropriate healthcare practitioners for all other medical problems and issues.    RETURN TO CLINIC: Return for 3 weeks after SS - discuss results w/ any provider.    My total time spent caring for the patient on the day of the encounter was 40 minutes. This includes time spent on a  thorough chart review including other physician notes, all sleep studies, as well as critical labs and pulmonary and cardiac studies.  Additionally, it includes a thorough discussion of good sleep hygiene and stimulus control, as well as  the need for consistency in terms of sleep preparation and practice.    Please note that this dictation was created using voice recognition software.  I have made every reasonable attempt to correct obvious errors, I expect that there are errors of grammar and possibly content that I did not discover before finalizing this note.

## 2023-10-13 ENCOUNTER — TELEPHONE (OUTPATIENT)
Dept: HEALTH INFORMATION MANAGEMENT | Facility: OTHER | Age: 58
End: 2023-10-13
Payer: COMMERCIAL

## 2023-10-16 ENCOUNTER — APPOINTMENT (OUTPATIENT)
Dept: RADIOLOGY | Facility: MEDICAL CENTER | Age: 58
End: 2023-10-16
Attending: FAMILY MEDICINE
Payer: COMMERCIAL

## 2024-01-05 ENCOUNTER — HOSPITAL ENCOUNTER (OUTPATIENT)
Dept: RADIOLOGY | Facility: MEDICAL CENTER | Age: 59
End: 2024-01-05
Payer: COMMERCIAL

## 2024-01-10 ENCOUNTER — HOME STUDY (OUTPATIENT)
Dept: SLEEP MEDICINE | Facility: MEDICAL CENTER | Age: 59
End: 2024-01-10
Attending: PREVENTIVE MEDICINE
Payer: COMMERCIAL

## 2024-01-10 DIAGNOSIS — R06.83 SNORING: ICD-10-CM

## 2024-01-10 DIAGNOSIS — G47.33 OSA (OBSTRUCTIVE SLEEP APNEA): ICD-10-CM

## 2024-01-10 DIAGNOSIS — R06.81 WITNESSED EPISODE OF APNEA: ICD-10-CM

## 2024-01-10 PROCEDURE — 95800 SLP STDY UNATTENDED: CPT | Performed by: PREVENTIVE MEDICINE

## 2024-01-15 ENCOUNTER — HOSPITAL ENCOUNTER (OUTPATIENT)
Dept: RADIOLOGY | Facility: MEDICAL CENTER | Age: 59
End: 2024-01-15
Attending: FAMILY MEDICINE
Payer: COMMERCIAL

## 2024-01-15 DIAGNOSIS — Z12.31 VISIT FOR SCREENING MAMMOGRAM: ICD-10-CM

## 2024-01-15 PROCEDURE — 77067 SCR MAMMO BI INCL CAD: CPT

## 2024-01-22 NOTE — PROCEDURES
DIAGNOSTIC HOME SLEEP TEST (HST) REPORT WatchPAT      PATIENT ID:  NAME:  Lorena Bermudez  MRN:               [id number]  YOB: 1965  DATE OF STUDY:01/10/2024       Impression:     This study shows evidence of:      1. Severe obstructive sleep apnea with PAT apnea hyponea index (pAHI 3%) of 49.3 per hour.  PAT respiratory disturbance index (pRDI) was 49.4 per hour. These findings are based on 7 channels recording of PAT signal with sleep staging, heart rate, pulse oximetry, actigraphy, body position, snoring and respiratory movement.     2. Oxygenation O2 Sat. mean O2 sat was 91%,  jorgito was 83%,  and maximum O2 at 98 %. O2 sat was at or  below 88% for 27 min of evaluation time. Oxygen Desaturation (4%) Index was elevated at 34.3/hr.  PAT apnea hyponea index (pAHI 4%) of 35.4 per hour.  AVG HR was 70 BPM.      TECHNICAL DESCRIPTION: Patient underwent home sleep apnea testing with peripheral arterial tone signal (WatchPAT™). This is a Type IV portable monitor and device. Monitoring was done with 7 channels recording of PAT signal with sleep staging, heart rate, pulse oximetry, actigraphy, body position, snoring and respiratory movement. Prior to using the device, the patient received verbal and written instructions for its application and was provided with the help desk phone number for additional telephonic instruction with 24-hour availability of qualified personnel to answer questions.    AHI  vs RDI:  The pRDI is calculated in a very similar way as the pAHI but an additional type of respiratory events named RERA are also counted. RERA  is the abbreviation for respiratory effort related arousal and is essentially a very short arousal of a few seconds that follows partial occlusion of the airway.  The normal range of the RDI score is also less than 5/hour.    General sleep summary: . Total recording time is 7 hours and 55 minutes and approximate sleep time is 7 hours and 7 minutes.  The patient spent 268 minutes in the supine position and 159 minutes in the nonsupine position.  Supine AHI (3%) 50.2.    Recommendations:    CPAP titration study vs Auto CPAP trial.    Patient may do well on a ResMed APAP with initial settings from min 6 to max 16 cm H20. Careful mask fit and heated humidification are required part of this prescription. This patient will need a follow-up oximetry study while using PAP therapy.  This patient will need to meet all insurance compliance requirements.     In general patients with sleep apnea are advised to avoid alcohol and sedatives and to not operate a motor vehicle while drowsy. In some cases alternative treatment options may prove effective in resolving sleep apnea in these options include upper airway surgery, the use of a dental orthotic or weight loss and positional therapy. Clinical correlation is required.

## 2024-02-07 ENCOUNTER — OFFICE VISIT (OUTPATIENT)
Dept: SLEEP MEDICINE | Facility: MEDICAL CENTER | Age: 59
End: 2024-02-07
Attending: PREVENTIVE MEDICINE
Payer: COMMERCIAL

## 2024-02-07 VITALS
WEIGHT: 181 LBS | OXYGEN SATURATION: 91 % | RESPIRATION RATE: 16 BRPM | DIASTOLIC BLOOD PRESSURE: 80 MMHG | HEIGHT: 61 IN | SYSTOLIC BLOOD PRESSURE: 122 MMHG | BODY MASS INDEX: 34.17 KG/M2 | HEART RATE: 79 BPM

## 2024-02-07 DIAGNOSIS — G47.33 OSA (OBSTRUCTIVE SLEEP APNEA): ICD-10-CM

## 2024-02-07 PROCEDURE — 99214 OFFICE O/P EST MOD 30 MIN: CPT | Performed by: PREVENTIVE MEDICINE

## 2024-02-07 PROCEDURE — 3079F DIAST BP 80-89 MM HG: CPT | Performed by: PREVENTIVE MEDICINE

## 2024-02-07 PROCEDURE — 99212 OFFICE O/P EST SF 10 MIN: CPT | Performed by: PREVENTIVE MEDICINE

## 2024-02-07 PROCEDURE — 3074F SYST BP LT 130 MM HG: CPT | Performed by: PREVENTIVE MEDICINE

## 2024-02-07 NOTE — PROGRESS NOTES
"CHIEF COMPLIANT: \"How did I do on my sleep study?\"    was available for the entire visit.    LAST SEEN:  Dr. Jean on 9/20/2023  HISTORY OF PRESENT ILLNESS:  Lorena Bermudez is a 58 y.o.female who is here for sleep study results. The patient brought in her old [employer] CPAP Remstar Pro.  However, she did indicate that it was no longer working.  She had used this older machine for several years and it stopped working approximately 6 mos ago.      Sleep study results:   TYPE: [facility] HST  DATE:  1/10/2024  Diagnostic:AHI:  49.3  Diagnostic  Oxygen Sundeep: 83% with 27mins at or under 88%       Lorena Bermudez has been tested for sleep apnea   See below for results of Sleep Study done through [facility] in 2015.  Patient was started on therapy but stopped it.   The patient reports loud snoring, poor quality sleep, and not enough sleep.    The patient goes to bed at 8 -10 pm and wakes up at 6-7 am. It usually takes the patient approximately 60 mins mins to fall asleep. The patient does feel refreshed and does  nap during the day.( 1 hour  around 3:30 )  This pt is a former smoker.  The patient smoked  1 pack per week  for 20 years and quit 11 years ago. . Pt does not use cannabis.  This pt does  NOT drink  alcoholic beverages and has 1 caffeinated beverages daily.        SLEEP STUDY - [facility]  TYPE:  In lab PSG  DATE:  11/26/2015  Diagnostic:AHI:  33.9  AHI, Supine 51.4  Diagnostic  Oxygen Sundeep: 77% with 88 mins at or under 88%     Significant comorbidities and modifying factors: see below     PAST MEDICAL HISTORY:  Past Medical History:   Diagnosis Date    Depression     Heart burn     Hypertension     Indigestion     Other specified disorder of intestines     Psychiatric problem     DEPRESSION      PROBLEM LIST:  Patient Active Problem List    Diagnosis Date Noted    JACKIE (obstructive sleep apnea) 05/23/2018    Obesity (BMI 30-39.9) 12/08/2017    Thrombocytopenia, idiopathic (HCC) 08/05/2014    Other " specified pre-operative examination 07/24/2014     PAST SOCIAL HISTORY:  Past Surgical History:   Procedure Laterality Date    LUDMILA BY LAPAROSCOPY  7/24/2014    Performed by Charles Diop M.D. at SURGERY [facility]    GYN SURGERY  2008    hysterectomy    COLONOSCOPY      ENDOSCOPY       PAST FAMILY HISTORY:  Family History   Problem Relation Age of Onset    Sleep Apnea Neg Hx      SOCIAL HISTORY:  Social History     Socioeconomic History    Marital status:      Spouse name: Not on file    Number of children: Not on file    Years of education: Not on file    Highest education level: Not on file   Occupational History    Not on file   Tobacco Use    Smoking status: Never    Smokeless tobacco: Never   Vaping Use    Vaping Use: Never used   Substance and Sexual Activity    Alcohol use: No    Drug use: No    Sexual activity: Not on file   Other Topics Concern    Not on file   Social History Narrative    ** Merged History Encounter **          Social Determinants of Health     Financial Resource Strain: Not on file   Food Insecurity: Not on file   Transportation Needs: Not on file   Physical Activity: Not on file   Stress: Not on file   Social Connections: Not on file   Intimate Partner Violence: Not on file   Housing Stability: Not on file     ALLERGIES: Patient has no known allergies.  MEDICATIONS:  Current Outpatient Medications   Medication Sig Dispense Refill    LISINOPRIL PO Take  by mouth.      AZO-CRANBERRY PO Take  by mouth.      losartan (COZAAR) 50 MG Tab Take 50 mg by mouth every day.      propranolol (INDERAL) 20 MG TABS Take 20 mg by mouth every day.      clonazepam (KLONOPIN) 0.5 MG TABS Take 0.25 mg by mouth 2 times a day.      fluoxetine (PROZAC) 20 MG CAPS Take 20 mg by mouth every day.      clonazepam (KLONOPIN) 0.5 MG TABS Take 0.25 mg by mouth 2 times a day.      metformin (GLUCOPHAGE) 500 MG Tab Take 500 mg by mouth 2 times a day, with meals. (Patient not taking: Reported on  "9/20/2023)      HYDROCHLOROTHIAZIDE PO Take  by mouth. (Patient not taking: Reported on 9/20/2023)      enalapril (VASOTEC) 20 MG tablet Take 20 mg by mouth every day. (Patient not taking: Reported on 9/20/2023)      fluoxetine (PROZAC) 20 MG CAPS Take 20 mg by mouth 2 times a day. (Patient not taking: Reported on 9/20/2023)      acetaminophen (TYLENOL) 325 MG TABS Take 650 mg by mouth every four hours as needed. (Patient not taking: Reported on 9/20/2023)      enalapril (VASOTEC) 20 MG tablet Take 20 mg by mouth every day. (Patient not taking: Reported on 9/20/2023)      propranolol (INDERAL) 20 MG TABS Take 20 mg by mouth 3 times a day. (Patient not taking: Reported on 9/20/2023)      quetiapine (SEROQUEL) 25 MG TABS Take 25 mg by mouth 2 times a day. (Patient not taking: Reported on 9/20/2023)      simvastatin (ZOCOR) 20 MG TABS Take 20 mg by mouth every evening. (Patient not taking: Reported on 9/20/2023)       No current facility-administered medications for this visit.    \"CURRENT RX\"    REVIEW OF SYSTEMS:  Constitutional: Denies weight loss, endorses chronic daytime fatigue.  See HPI      PHYSICAL EXAM/VITALS:  /80 (BP Location: Left arm, Patient Position: Sitting, BP Cuff Size: Adult)   Pulse 79   Resp 16   Ht 1.549 m (5' 1\")   Wt 82.1 kg (181 lb)   SpO2 91%   BMI 34.20 kg/m²   Appearance: Well-nourished, well-developed,  looks stated age, no acute distress  Eyes:   EOMI  ENMT:  WNL  Neck: Supple, trachea midline  Respiratory effort:  No intercostal retractions or use of accessory muscles  Musculoskeletal:  Grossly normal; gait and station normal  Neurologic:  oriented to person, time, place, and purpose; judgement intact  Psychiatric:  No depression, anxiety, agitation      MEDICAL DECISION MAKING:  The medical record was reviewed as it pertains to this referral. This includes records from primary care,consultants notes, referral request, hospital records, labs and imaging. Any available " diagnostic and titration nocturnal polysomnograms, home sleep apnea tests, continuous nocturnal oximetry results, multiple sleep latency tests, and recent compliance reports were reviewed with the patient.    ASSESSMENT/PLAN:  Lorena Bermudez is a 58 y.o.female who has severe JACKIE and moderate nocturnal hypoxia.  It is highly likely that once her JACKIE is treated, her O2 will stabilize above 88%  Her insurance is [employer].  The orders written today will be sent to [employer] and they will negotiate with the local DME's where this patient can obtain a machine and supplies.  The patient was given a handout of the bolded information below.  This was written in English and she will ask her granddaughter to interpret it for her.    DIAGNOSES :    1. JACKIE (obstructive sleep apnea)  - DME Mask Fitting; Future  - DME CPAP        MEETING INSURANCE COMPLIANCE REQUIREMENTS and MISC tips:    The patient has 90 days in order to be deemed compliant by the insurance company.  The 90-day starts the day that the patient receives the machine and supplies from the DME.  It is during this period of time that the patient must demonstrate a consistent use of pap (greater than 4 hours/day for a minimum of 23 days out of 30).  The patient is aware that  PAP usage ( time) will be added up--  together over a 24-hour period.  This simply means that the patient does not have to use the machine for 4 hours in a row and the patient does not have to be asleep for the minutes to count towards the required 4 hours.     It is recommended to the patient that the patient begin Pap therapy by first just wearing the mask and headgear loosely applied to the face for 20 minutes a day for the first 2-3 days.  Then  the patient may begin using the mask and headgear with the machine to get a feel for using the mask with a good seal This should be accomplished by using the entire PAP set up with the machine on for 20 to 30 minute(the  ramp time) while the patient is awake.  Try this for 2 days.  These usage exercises will  allow the face and brain to get accustomed to mask, headgear and air pressures.  Now the patient can begin to use the Pap therapy for sleep.     Also  the patient is instructed to keep the machine located slightly ( 6-12 ins)  below the level of the mattress.  This allows for proper humidification of the air before it reaches nasal passages and prevents inhaling water droplets.      Cleaning the mask, headgear, tubing, water reservoir is to be done using hot water and a gentle detergent once a week.        PAP THERAPY  EQUIPMENT AND SUPPLIES SCHEDULE    Mask cushion every month  Nasal pillows 2 times per month  Mask every 6 months  Head gear every 6 months  Tubing every 3 months  Ultra-fine filters 2 times per month  Foam filter every 6 months  Humidifier chamber every 6 months     The risks of untreated sleep apnea were discussed with the patient at length. Patients with JACKIE are at increased risk of cardiovascular disease including coronary artery disease, systemic arterial hypertension, pulmonary arterial hypertension, cardiac arrhythmias, and stroke. JACKIE patients have an increased risk of motor vehicle accidents, type 2 diabetes, chronic kidney disease, and non-alcoholic liver disease. The patient was advised to avoid driving a motor vehicle when drowsy.  Have advised the patient to follow up with the appropriate healthcare practitioners for all other medical problems and issues.    RETURN TO CLINIC: Return in about 3 months (around 5/7/2024) for Compliance check, With Dr Jean.    My total time spent caring for the patient on the day of the encounter was 40 minutes. This includes time spent on a thorough chart review including other physician notes, all sleep studies, as well as critical labs and pulmonary and cardiac studies.  Additionally, it includes a thorough discussion of good sleep hygiene and stimulus control, as  well as  the need for consistency in terms of sleep preparation and practice.    Please note that this dictation was created using voice recognition software.  I have made every reasonable attempt to correct obvious errors, I expect that there are errors of grammar and possibly content that I did not discover before finalizing this note.

## 2024-05-07 ENCOUNTER — APPOINTMENT (OUTPATIENT)
Dept: SLEEP MEDICINE | Facility: MEDICAL CENTER | Age: 59
End: 2024-05-07
Attending: PREVENTIVE MEDICINE
Payer: COMMERCIAL